# Patient Record
Sex: FEMALE | Race: WHITE | Employment: UNEMPLOYED | RURAL
[De-identification: names, ages, dates, MRNs, and addresses within clinical notes are randomized per-mention and may not be internally consistent; named-entity substitution may affect disease eponyms.]

---

## 2017-01-06 ENCOUNTER — OFFICE VISIT (OUTPATIENT)
Dept: FAMILY MEDICINE CLINIC | Age: 62
End: 2017-01-06

## 2017-01-06 VITALS
OXYGEN SATURATION: 97 % | SYSTOLIC BLOOD PRESSURE: 140 MMHG | DIASTOLIC BLOOD PRESSURE: 74 MMHG | BODY MASS INDEX: 29.87 KG/M2 | RESPIRATION RATE: 17 BRPM | WEIGHT: 174 LBS | TEMPERATURE: 98.7 F | HEART RATE: 82 BPM

## 2017-01-06 DIAGNOSIS — I10 HTN (HYPERTENSION), BENIGN: ICD-10-CM

## 2017-01-06 DIAGNOSIS — Z23 ENCOUNTER FOR IMMUNIZATION: ICD-10-CM

## 2017-01-06 DIAGNOSIS — K75.4 AUTOIMMUNE HEPATITIS (HCC): Primary | ICD-10-CM

## 2017-01-06 DIAGNOSIS — R74.8 ABNORMAL LIVER ENZYMES: ICD-10-CM

## 2017-01-06 RX ORDER — LISINOPRIL AND HYDROCHLOROTHIAZIDE 10; 12.5 MG/1; MG/1
1 TABLET ORAL DAILY
Qty: 90 TAB | Refills: 3 | Status: SHIPPED | OUTPATIENT
Start: 2017-01-06 | End: 2018-05-09 | Stop reason: SDUPTHER

## 2017-01-06 NOTE — MR AVS SNAPSHOT
Visit Information Date & Time Provider Department Dept. Phone Encounter #  
 1/6/2017  1:20 [place] [address] [place] [id number] Follow-up Instructions Return in about 6 months (around 7/6/2017). Follow-up and Disposition History Upcoming Health Maintenance Date Due Hepatitis C Screening 1955 DTaP/Tdap/Td series (1 - Tdap) 9/10/1976 PAP AKA CERVICAL CYTOLOGY 9/10/1976 BREAST CANCER SCRN MAMMOGRAM 9/10/2005 FOBT Q 1 YEAR AGE 50-75 9/10/2005 ZOSTER VACCINE AGE 60> 9/10/2015 INFLUENZA AGE 9 TO ADULT 8/1/2016 Allergies as of 1/6/2017  Review Complete On: 1/6/2017 By: Corinne Bugler, MD  
 No Known Allergies Current Immunizations  Reviewed on 1/6/2017 Name Date Influenza Vaccine 1/6/2017 Reviewed by Anastasiia Gardner LPN on 4/2/2208 at  8:75 PM  
You Were Diagnosed With   
  
 Codes Comments [facility] ([facility]    -  Primary ICD-10-CM: K75.4 ICD-9-CM: 571.42 Abnormal liver enzymes     ICD-10-CM: R74.8 ICD-9-CM: 790.5 HTN (hypertension), benign     ICD-10-CM: I10 
ICD-9-CM: 401.1 Encounter for immunization     ICD-10-CM: D35 ICD-9-CM: V03.89 Vitals BP Pulse Temp Resp Weight(growth percentile) SpO2  
 140/74 (BP 1 Location: Right arm, BP Patient Position: Sitting) 82 98.7 °F (37.1 °C) (Oral) 17 174 lb (78.9 kg) 97% BMI OB Status Smoking Status 29.87 kg/m2 Hysterectomy Never Smoker BMI and BSA Data Body Mass Index Body Surface Area  
 29.87 kg/m 2 1.89 m 2 Preferred Pharmacy Pharmacy Name Phone [address], [address] AT [facility] OF  [address] & [address] [phone] Your Updated Medication List  
  
   
This list is accurate as of: 1/6/17  1:57 PM.  Always use your most recent med list.  
  
  
  
  
 escitalopram oxalate 20 mg tablet Commonly known as:  Noble Lopez TAKE 1 TABLET BY MOUTH DAILY HYDROcodone-acetaminophen 7.5-325 mg per tablet Commonly known as:  Jonathan Bent Take 1 Tab by mouth every four (4) hours as needed for Pain.  
  
 ketorolac 10 mg tablet Commonly known as:  TORADOL Take 10 mg by mouth every six (6) hours as needed for Pain. lisinopril-hydroCHLOROthiazide 10-12.5 mg per tablet Commonly known as:  Linda Altamirano Take 1 Tab by mouth daily. predniSONE 20 mg tablet Commonly known as:  DELTASONE  
3 po qdx7 d, then 2 po qdx7 d then 1.5 po qdx14 d then 1 po qd for liver  
  
 promethazine 25 mg tablet Commonly known as:  PHENERGAN Take 1 Tab by mouth every six (6) hours as needed for Nausea. Indications: nausea STOOL SOFTENER 100 mg capsule Generic drug:  docusate sodium Take 100 mg by mouth two (2) times daily as needed for Constipation. traZODone 50 mg tablet Commonly known as:  Lemmie Seble Take 2 Tabs by mouth nightly. Prescriptions Sent to Pharmacy Refills  
 lisinopril-hydroCHLOROthiazide (PRINZIDE, ZESTORETIC) 10-12.5 mg per tablet 3 Sig: Take 1 Tab by mouth daily. Class: Normal  
 Pharmacy: [facility] [place] [address], [facility] [address] Ph #: [phone] Route: Oral  
  
We Performed the Following ACTIN (SMOOTH MUSCLE) ANTIBODY [id number] CPT(R)] HEPATIC FUNCTION PANEL [13716 CPT(R)] INFLUENZA VIRUS VACCINE, FLULAVAL VACC, 3 YRS & >, IM, MEDICARE ONLY [ [facility]] MITOCHONDRIAL M2 AB [id number] CPT(R)] PROTEIN ELECTROPHORESIS [06029 CPT(R)] Follow-up Instructions Return in about 6 months (around 7/6/2017). Patient Instructions If you have any questions regarding [employer], you may call [employer] support at [phone]. Introducing [facility] SERVICES!    
 Elvi Luna introduces [employer] patient portal. Now you can access parts of your medical record, email your doctor's office, and request medication refills online. 1. In your internet browser, go to [email or web address] 2. Click on the First Time User? Click Here link in the Sign In box. You will see the New Member Sign Up page. 3. Enter your CallAround Access Code exactly as it appears below. You will not need to use this code after youve completed the sign-up process. If you do not sign up before the expiration date, you must request a new code. · CallAround Access Code: [email or web address] Expires: 1/30/2017 11:44 AM 
 
4. Enter the last four digits of your Social Security Number (xxxx) and Date of Birth (mm/dd/yyyy) as indicated and click Submit. You will be taken to the next sign-up page. 5. Create a CallAround ID. This will be your CallAround login ID and cannot be changed, so think of one that is secure and easy to remember. 6. Create a CallAround password. You can change your password at any time. 7. Enter your Password Reset Question and Answer. This can be used at a later time if you forget your password. 8. Enter your e-mail address. You will receive e-mail notification when new information is available in [address] 9. Click Sign Up. You can now view and download portions of your medical record. 10. Click the Download Summary menu link to download a portable copy of your medical information. If you have questions, please visit the Frequently Asked Questions section of the CallAround website. Remember, CallAround is NOT to be used for urgent needs. For medical emergencies, dial 911. Now available from your iPhone and Android! Please provide this summary of care documentation to your next provider. Your primary care clinician is listed as Trey Henriquez. If you have any questions after today's visit, please call [phone].

## 2017-01-06 NOTE — PROGRESS NOTES
Summer Sargent is a 64 y.o. female presenting for/with: Ankle Injury (followup for Hepatits visit. ) and Knee Pain (having MRI 1-16-17 [facility])      HPI:  F/U autoimmune heptatitis  Went off of prednisone for 6 weeks post ORIF L ankle with Dr. Ayad Guy, never went back on, but sx have been in full remission since last visit. S/P US, labs, CT abd/pelvis, showing no GB problems, stable hepatic cysts, bilat renal calculi (oxalate per UA), neg 48h urine cx, mildly low K+, mod elev AST, ALT, and Alk Phos, mildly low PLT at 127, with normal bili, Troponin, lipase, INR, WBC, H/H, EKG, and normal viral hepatitis assays. iron level normal. CITLALY normal. Sed rate 3. Monospot neg. Treatment to date: prednisone suppression tx, nataly well, off since Nov 2017. No loose stool though or acholic stools reported. PMH, SH, Medications/Allergies: reviewed, on chart. ROS:  Constitutional: No fever, chills or weight loss  Respiratory: No cough, SOB   CV: No chest pain or Palpitations    Visit Vitals    /74 (BP 1 Location: Right arm, BP Patient Position: Sitting)    Pulse 82    Temp 98.7 °F (37.1 °C) (Oral)    Resp 17    Wt 174 lb (78.9 kg)    SpO2 97%    BMI 29.87 kg/m2     Wt Readings from Last 3 Encounters:   01/06/17 174 lb (78.9 kg)   11/15/16 175 lb (79.4 kg)   11/03/16 179 lb (81.2 kg)     Physical Examination: General appearance - alert, well appearing, and in no distress  Mental status - alert, oriented to person, place, and time  Eyes - pupils equal and reactive, extraocular eye movements intact  ENT - bilateral external ears and nose normal. Normal lips  Neck - supple, no significant adenopathy, no thyromegaly or mass  Lymphatics - no palpable lymphadenopathy, no hepatosplenomegaly  Chest - clear to auscultation, no wheezes, rales or rhonchi, symmetric air entry  Heart - normal rate, regular rhythm, normal S1, S2, no murmurs, rubs, clicks or gallops  Abd - NABS. Soft.  Mildly ttp to RUQ, elicits index discomfort, no G/R/R, no HSM/Mass. Extremities - peripheral pulses normal, no pedal edema, no clubbing or cyanosis     A/P:  Probable Autoimmune hepatitis. Recheck LFT's, check ASMA, AMA, SPEP. Ok to hold prednisone for now if LFT's are in normal range. Liver precautions. F/U 3mo or PRN labs. Ankle fx  Doing well. F/U with Ortho in Feb    HTN  well controlled. con't current tx. RF lisinopril.     Flu shot today  Gisele Vann MD

## 2017-01-06 NOTE — PATIENT INSTRUCTIONS
If you have any questions regarding [employer], you may call [employer] support at [phone].

## 2017-01-09 LAB — SPECIMEN STATUS REPORT, ROLRST: NORMAL

## 2017-01-11 LAB
ACTIN IGG SERPL-ACNC: 13 UNITS (ref 0–19)
ALBUMIN SERPL ELPH-MCNC: 3.5 G/DL (ref 2.9–4.4)
ALBUMIN SERPL-MCNC: 4 G/DL (ref 3.6–4.8)
ALBUMIN/GLOB SERPL: 1.3 {RATIO} (ref 0.7–1.7)
ALP SERPL-CCNC: 106 IU/L (ref 39–117)
ALPHA1 GLOB SERPL ELPH-MCNC: 0.2 G/DL (ref 0–0.4)
ALPHA2 GLOB SERPL ELPH-MCNC: 0.6 G/DL (ref 0.4–1)
ALT SERPL-CCNC: 19 IU/L (ref 0–32)
AST SERPL-CCNC: 20 IU/L (ref 0–40)
B-GLOBULIN SERPL ELPH-MCNC: 1 G/DL (ref 0.7–1.3)
BILIRUB DIRECT SERPL-MCNC: 0.11 MG/DL (ref 0–0.4)
BILIRUB SERPL-MCNC: 0.3 MG/DL (ref 0–1.2)
GAMMA GLOB SERPL ELPH-MCNC: 0.8 G/DL (ref 0.4–1.8)
GLOBULIN SER CALC-MCNC: 2.6 G/DL (ref 2.2–3.9)
M PROTEIN SERPL ELPH-MCNC: NORMAL G/DL
MITOCHONDRIA M2 IGG SER-ACNC: 6.6 UNITS (ref 0–20)
PLEASE NOTE, 011150: NORMAL
PROT SERPL-MCNC: 6.1 G/DL (ref 6–8.5)

## 2017-01-11 NOTE — PROGRESS NOTES
Liver is back to normal. Looks like a transient liver irritation. No sign current autoimmune activity.

## 2017-09-29 ENCOUNTER — OFFICE VISIT (OUTPATIENT)
Dept: FAMILY MEDICINE CLINIC | Age: 62
End: 2017-09-29

## 2017-09-29 VITALS
HEART RATE: 68 BPM | BODY MASS INDEX: 31.07 KG/M2 | HEIGHT: 64 IN | TEMPERATURE: 99 F | SYSTOLIC BLOOD PRESSURE: 110 MMHG | OXYGEN SATURATION: 97 % | WEIGHT: 182 LBS | DIASTOLIC BLOOD PRESSURE: 61 MMHG

## 2017-09-29 DIAGNOSIS — I10 HTN (HYPERTENSION), BENIGN: ICD-10-CM

## 2017-09-29 DIAGNOSIS — Z12.39 SCREENING FOR MALIGNANT NEOPLASM OF BREAST: ICD-10-CM

## 2017-09-29 DIAGNOSIS — N95.1 MENOPAUSAL FLUSHING: ICD-10-CM

## 2017-09-29 DIAGNOSIS — F32.5 MAJOR DEPRESSIVE DISORDER WITH SINGLE EPISODE, IN FULL REMISSION (HCC): ICD-10-CM

## 2017-09-29 DIAGNOSIS — K75.4 AUTOIMMUNE HEPATITIS (HCC): ICD-10-CM

## 2017-09-29 DIAGNOSIS — Z23 ENCOUNTER FOR IMMUNIZATION: Primary | ICD-10-CM

## 2017-09-29 RX ORDER — VENLAFAXINE HYDROCHLORIDE 37.5 MG/1
CAPSULE, EXTENDED RELEASE ORAL
Qty: 30 CAP | Refills: 11 | Status: SHIPPED | OUTPATIENT
Start: 2017-09-29 | End: 2018-02-16 | Stop reason: ALTCHOICE

## 2017-09-29 RX ORDER — VENLAFAXINE HYDROCHLORIDE 75 MG/1
75 CAPSULE, EXTENDED RELEASE ORAL DAILY
Qty: 30 CAP | Refills: 11 | Status: SHIPPED | OUTPATIENT
Start: 2017-09-29 | End: 2018-02-16 | Stop reason: ALTCHOICE

## 2017-09-29 NOTE — PROGRESS NOTES
Guillermina Woo is a 58 y.o. female presenting for/with:    Abnormal liver tests (bw); Numbness (right hand); and Excessive Sweating (hot flash)    HPI:  Menopausal syndrome  Pt notes hot flushing is getting gradually worse over past year or so. Interested in tx     Anxiety and depression  Have been doing well on lexapro, nataly well. PHQ over the last two weeks 9/29/2017   Little interest or pleasure in doing things Not at all   Feeling down, depressed or hopeless Not at all   Total Score PHQ 2 0   Thoughts of being better off dead, or hurting yourself in some way Not at all     DJD  Using aleve and motrin PRN for joints. Works pretty well, minimal heartburn sx, rare. Autoimmune hepatitis  Sx resolved after last year. Last LFT's normalized by last Jan 2017. Due for recheck  Lab Results   Component Value Date/Time    ALT (SGPT) 19 01/06/2017 01:50 PM    AST (SGOT) 20 01/06/2017 01:50 PM    Alk. phosphatase 106 01/06/2017 01:50 PM    Bilirubin, direct 0.11 01/06/2017 01:50 PM    Bilirubin, total 0.3 01/06/2017 01:50 PM     Hypertension. Blood pressures have been well controlled. Management at last visit included continuing current regimen . Current regimen: ACE inhibitor and thiazide diuretic. Symptoms include no symptoms. Patient denies chest pain, palpitations, peripheral edema. Lab review: No results found for: NA, K, CL, CO2, AGAP, GLU, BUN, CREA, BUCR, GFRAA, GFRNA, CA, GFRAA  No results found for: CHOL, CHOLPOCT, CHOLX, CHLST, CHOLV, HDL, HDLPOC, LDL, LDLCPOC, LDLC, DLDLP, VLDLC, VLDL, TGLX, TRIGL, TRIGP, TGLPOCT, CHHD, CHHDX    PMH, SH, Medications/Allergies: reviewed, on chart.     ROS:  Constitutional: No fever, chills or weight loss  Respiratory: No cough, SOB   CV: No chest pain or Palpitations    Visit Vitals    /61    Pulse 68    Temp 99 °F (37.2 °C) (Temporal)    Ht 5' 4\" (1.626 m)    Wt 182 lb (82.6 kg)    SpO2 97%    BMI 31.24 kg/m2     Wt Readings from Last 3 Encounters:   09/29/17 182 lb (82.6 kg)   01/06/17 174 lb (78.9 kg)   11/15/16 175 lb (79.4 kg)   +8#    Physical Examination: General appearance - alert, well appearing, and in no distress  Mental status - alert, oriented to person, place, and time  Eyes - pupils equal and reactive, extraocular eye movements intact  ENT - bilateral external ears and nose normal. Normal lips  Neck - supple, no significant adenopathy, no thyromegaly or mass  Lymphatics - no palpable lymphadenopathy, no hepatosplenomegaly  Chest - clear to auscultation, no wheezes, rales or rhonchi, symmetric air entry  Heart - normal rate, regular rhythm, normal S1, S2, no murmurs, rubs, clicks or gallops  Extremities - peripheral pulses normal, no pedal edema, no clubbing or cyanosis    A/P:  Hx autoimmune hepatitis  Doing well. Recheck labs    Hot flushing/menopause and depression  Try change lexapro to effexor. HTN  well controlled. con't current tx. Check routine labs. F/U 6mo/PRN.

## 2017-09-29 NOTE — MR AVS SNAPSHOT
Visit Information Date & Time Provider Department Dept. Phone Encounter #  
 9/29/2017  9:30 AM Wilbur Herrera MD Barbara Almazan [id number] Follow-up Instructions Return in about 6 months (around 3/29/2018), or if symptoms worsen or fail to improve. Follow-up and Disposition History Upcoming Health Maintenance Date Due Hepatitis C Screening 1955 BREAST CANCER SCRN MAMMOGRAM 9/10/2005 FOBT Q 1 YEAR AGE 50-75 9/10/2005 ZOSTER VACCINE AGE 60> 7/10/2015 INFLUENZA AGE 9 TO ADULT 8/1/2017 PAP AKA CERVICAL CYTOLOGY 9/30/2027* DTaP/Tdap/Td series (2 - Td) 9/29/2027 *Topic was postponed. The date shown is not the original due date. Allergies as of 9/29/2017  Review Complete On: 9/29/2017 By: Wilbur Herrera MD  
 No Known Allergies Current Immunizations  Reviewed on 9/29/2017 Name Date Influenza Vaccine 1/6/2017 Influenza Vaccine (Quad) PF 9/29/2017 Reviewed by Oscar Gomez LPN on 7/53/7098 at  9:55 AM  
 Reviewed by Oscar Gomez LPN on 0/73/3261 at  9:55 AM  
 Reviewed by Oscar Gomez LPN on 6/02/9962 at  9:55 AM  
You Were Diagnosed With   
  
 Codes Comments Encounter for immunization    -  Primary ICD-10-CM: O73 ICD-9-CM: V03.89 HTN (hypertension), benign     ICD-10-CM: I10 
ICD-9-CM: 401.1 Autoimmune hepatitis ([facility]     ICD-10-CM: K75.4 ICD-9-CM: 571.42 Major depressive disorder with single episode, in full remission ([facility]     ICD-10-CM: F32.5 ICD-9-CM: 296.26 Menopausal flushing     ICD-10-CM: N95.1 ICD-9-CM: 627.2 Screening for malignant neoplasm of breast     ICD-10-CM: Z12.39 
ICD-9-CM: V76.10 Vitals BP Pulse Temp Height(growth percentile) Weight(growth percentile) SpO2  
 110/61 68 99 °F (37.2 °C) (Temporal) 5' 4\" (1.626 m) 182 lb (82.6 kg) 97% BMI OB Status Smoking Status 31.24 kg/m2 Hysterectomy Never Smoker BMI and BSA Data Body Mass Index Body Surface Area  
 31.24 kg/m 2 1.93 m 2 Preferred Pharmacy Pharmacy Name Phone [address], [address] AT [facility] OF [address] [place] [place] [phone] Your Updated Medication List  
  
   
This list is accurate as of: 17 11:47 AM.  Always use your most recent med list.  
  
  
  
  
 escitalopram oxalate 20 mg tablet Commonly known as:  Chandler Rye Take 1 Tab by mouth daily. Indications: DUE FOR VISIT  
  
 lisinopril-hydroCHLOROthiazide 10-12.5 mg per tablet Commonly known as:  Carmelo Sinning Take 1 Tab by mouth daily. STOOL SOFTENER 100 mg capsule Generic drug:  docusate sodium Take 100 mg by mouth two (2) times daily as needed for Constipation. traZODone 50 mg tablet Commonly known as:  Donneta [place] Take 2 Tabs by mouth nightly. varicella zoster vacine live 19,400 unit/0.65 mL Susr injection Commonly known as:  ZOSTAVAX  
1 Vial by SubCUTAneous route once for 1 dose. * venlafaxine-SR 37.5 mg capsule Commonly known as:  EFFEXOR-XR  
1 daily for 6 days, then 2 daily until out for nerves and flushing * venlafaxine-SR 75 mg capsule Commonly known as:  EFFEXOR-XR Take 1 Cap by mouth daily. Indications: flushing and nerves * Notice: This list has 2 medication(s) that are the same as other medications prescribed for you. Read the directions carefully, and ask your doctor or other care provider to review them with you. Prescriptions Printed Refills  
 varicella zoster vacine live (ZOSTAVAX) 19,400 unit/0.65 mL susr injection 0 Si Vial by SubCUTAneous route once for 1 dose. Class: Print Route: SubCUTAneous  
 venlafaxine-SR (EFFEXOR-XR) 75 mg capsule 11 Sig: Take 1 Cap by mouth daily. Indications: flushing and nerves Class: Print Route: Oral  
  
Prescriptions Sent to Pharmacy Refills venlafaxine-SR (EFFEXOR-XR) 37.5 mg capsule 11 Si daily for 6 days, then 2 daily until out for nerves and flushing Class: Normal  
 Pharmacy: [facility] [place] [address], [address] [place] [address] [address] H[address] Ph #: [phone] We Performed the Following ADMIN INFLUENZA VIRUS VAC [ [facility]S] INFLUENZA VIRUS VAC QUAD,SPLIT,PRESV FREE SYRINGE IM [id number] CPT(R)] LIPID PANEL [25999 CPT(R)] METABOLIC PANEL, COMPREHENSIVE [67148 CPT(R)] Follow-up Instructions Return in about 6 months (around 3/29/2018), or if symptoms worsen or fail to improve. To-Do List   
 2017 Imaging:  [facility] MAMMO BI SCREENING INCL CAD Patient Instructions Cut the lexapro to half pill for 6 days, then half every other day for 6 days, then stop. Start the venlafaxine 37.5mg daily for 6 days, then 2 daily. If you have any questions regarding [employer], you may call [employer] support at [phone]. Patient Instructions History Introducing [facility] & HEALTH SERVICES! [employer] [place] introduces [employer] patient portal. Now you can access parts of your medical record, email your doctor's office, and request medication refills online. 1. In your internet browser, go to [email or web address] 2. Click on the First Time User? Click Here link in the Sign In box. You will see the New Member Sign Up page. 3. Enter your [employer] Access Code exactly as it appears below. You will not need to use this code after youve completed the sign-up process. If you do not sign up before the expiration date, you must request a new code. · [employer] Access Code: [email or web address] Expires: 2017  9:26 AM 
 
4. Enter the last four digits of your Social Security Number (xxxx) and Date of Birth (mm/dd/yyyy) as indicated and click Submit. You will be taken to the next sign-up page. 5. Create a MyChart ID.  This will be your MyChart login ID and cannot be changed, so think of one that is secure and easy to remember. 6. Create a [employer] password. You can change your password at any time. 7. Enter your Password Reset Question and Answer. This can be used at a later time if you forget your password. 8. Enter your e-mail address. You will receive e-mail notification when new information is available in [address] 9. Click Sign Up. You can now view and download portions of your medical record. 10. Click the Download Summary menu link to download a portable copy of your medical information. If you have questions, please visit the Frequently Asked Questions section of the [employer] website. Remember, [employer] is NOT to be used for urgent needs. For medical emergencies, dial 911. Now available from your iPhone and Android! Please provide this summary of care documentation to your next provider. Your primary care clinician is listed as Merari Ivy. If you have any questions after today's visit, please call [phone].

## 2017-09-29 NOTE — PATIENT INSTRUCTIONS
Cut the lexapro to half pill for 6 days, then half every other day for 6 days, then stop. Start the venlafaxine 37.5mg daily for 6 days, then 2 daily. If you have any questions regarding mychart, you may call [employer] support at [phone].

## 2017-09-30 LAB
ALBUMIN SERPL-MCNC: 4.5 G/DL (ref 3.6–4.8)
ALBUMIN/GLOB SERPL: 2.3 {RATIO} (ref 1.2–2.2)
ALP SERPL-CCNC: 99 IU/L (ref 39–117)
ALT SERPL-CCNC: 14 IU/L (ref 0–32)
AST SERPL-CCNC: 15 IU/L (ref 0–40)
BILIRUB SERPL-MCNC: 0.2 MG/DL (ref 0–1.2)
BUN SERPL-MCNC: 17 MG/DL (ref 8–27)
BUN/CREAT SERPL: 32 (ref 12–28)
CALCIUM SERPL-MCNC: 9.3 MG/DL (ref 8.7–10.3)
CHLORIDE SERPL-SCNC: 102 MMOL/L (ref 96–106)
CHOLEST SERPL-MCNC: 225 MG/DL (ref 100–199)
CO2 SERPL-SCNC: 27 MMOL/L (ref 18–29)
CREAT SERPL-MCNC: 0.53 MG/DL (ref 0.57–1)
GLOBULIN SER CALC-MCNC: 2 G/DL (ref 1.5–4.5)
GLUCOSE SERPL-MCNC: 112 MG/DL (ref 65–99)
HDLC SERPL-MCNC: 65 MG/DL
LDLC SERPL CALC-MCNC: 137 MG/DL (ref 0–99)
POTASSIUM SERPL-SCNC: 4.4 MMOL/L (ref 3.5–5.2)
PROT SERPL-MCNC: 6.5 G/DL (ref 6–8.5)
SODIUM SERPL-SCNC: 142 MMOL/L (ref 134–144)
TRIGL SERPL-MCNC: 113 MG/DL (ref 0–149)
VLDLC SERPL CALC-MCNC: 23 MG/DL (ref 5–40)

## 2017-12-27 DIAGNOSIS — K75.9 HEPATITIS: ICD-10-CM

## 2017-12-27 DIAGNOSIS — R74.8 ABNORMAL LIVER ENZYMES: ICD-10-CM

## 2017-12-29 RX ORDER — PREDNISONE 20 MG/1
TABLET ORAL
Qty: 60 TAB | Refills: 0 | Status: SHIPPED | OUTPATIENT
Start: 2017-12-29 | End: 2018-02-16

## 2018-02-16 ENCOUNTER — OFFICE VISIT (OUTPATIENT)
Dept: FAMILY MEDICINE CLINIC | Age: 63
End: 2018-02-16

## 2018-02-16 VITALS
OXYGEN SATURATION: 96 % | HEIGHT: 64 IN | DIASTOLIC BLOOD PRESSURE: 62 MMHG | RESPIRATION RATE: 16 BRPM | WEIGHT: 183.4 LBS | HEART RATE: 87 BPM | SYSTOLIC BLOOD PRESSURE: 100 MMHG | BODY MASS INDEX: 31.31 KG/M2

## 2018-02-16 DIAGNOSIS — F32.A ANXIETY AND DEPRESSION: Primary | ICD-10-CM

## 2018-02-16 DIAGNOSIS — K75.4 AUTOIMMUNE HEPATITIS (HCC): ICD-10-CM

## 2018-02-16 DIAGNOSIS — F41.9 ANXIETY AND DEPRESSION: Primary | ICD-10-CM

## 2018-02-16 PROBLEM — F32.1 MODERATE MAJOR DEPRESSION (HCC): Status: ACTIVE | Noted: 2018-02-16

## 2018-02-16 RX ORDER — ESCITALOPRAM OXALATE 20 MG/1
20 TABLET ORAL DAILY
Qty: 30 TAB | Refills: 5 | Status: SHIPPED | OUTPATIENT
Start: 2018-02-16 | End: 2018-09-26 | Stop reason: SDUPTHER

## 2018-02-16 RX ORDER — TRAZODONE HYDROCHLORIDE 50 MG/1
100 TABLET ORAL
Qty: 60 TAB | Refills: 5 | Status: SHIPPED | OUTPATIENT
Start: 2018-02-16 | End: 2019-04-24 | Stop reason: SDUPTHER

## 2018-02-16 NOTE — PATIENT INSTRUCTIONS
Anxiety Disorder: Care Instructions  Your Care Instructions    Anxiety is a normal reaction to stress. Difficult situations can cause you to have symptoms such as sweaty palms and a nervous feeling. In an anxiety disorder, the symptoms are far more severe. Constant worry, muscle tension, trouble sleeping, nausea and diarrhea, and other symptoms can make normal daily activities difficult or impossible. These symptoms may occur for no reason, and they can affect your work, school, or social life. Medicines, counseling, and self-care can all help. Follow-up care is a key part of your treatment and safety. Be sure to make and go to all appointments, and call your doctor if you are having problems. It's also a good idea to know your test results and keep a list of the medicines you take. How can you care for yourself at home? · Take medicines exactly as directed. Call your doctor if you think you are having a problem with your medicine. · Go to your counseling sessions and follow-up appointments. · Recognize and accept your anxiety. Then, when you are in a situation that makes you anxious, say to yourself, \"This is not an emergency. I feel uncomfortable, but I am not in danger. I can keep going even if I feel anxious. \"  · Be kind to your body:  ¨ Relieve tension with exercise or a massage. ¨ Get enough rest.  ¨ Avoid alcohol, caffeine, nicotine, and illegal drugs. They can increase your anxiety level and cause sleep problems. ¨ Learn and do relaxation techniques. See below for more about these techniques. · Engage your mind. Get out and do something you enjoy. Go to a funny movie, or take a walk or hike. Plan your day. Having too much or too little to do can make you anxious. · Keep a record of your symptoms. Discuss your fears with a good friend or family member, or join a support group for people with similar problems. Talking to others sometimes relieves stress.   · Get involved in social groups, or volunteer to help others. Being alone sometimes makes things seem worse than they are. · Get at least 30 minutes of exercise on most days of the week to relieve stress. Walking is a good choice. You also may want to do other activities, such as running, swimming, cycling, or playing tennis or team sports. Relaxation techniques  Do relaxation exercises 10 to 20 minutes a day. You can play soothing, relaxing music while you do them, if you wish. · Tell others in your house that you are going to do your relaxation exercises. Ask them not to disturb you. · Find a comfortable place, away from all distractions and noise. · Lie down on your back, or sit with your back straight. · Focus on your breathing. Make it slow and steady. · Breathe in through your nose. Breathe out through either your nose or mouth. · Breathe deeply, filling up the area between your navel and your rib cage. Breathe so that your belly goes up and down. · Do not hold your breath. · Breathe like this for 5 to 10 minutes. Notice the feeling of calmness throughout your whole body. As you continue to breathe slowly and deeply, relax by doing the following for another 5 to 10 minutes:  · Tighten and relax each muscle group in your body. You can begin at your toes and work your way up to your head. · Imagine your muscle groups relaxing and becoming heavy. · Empty your mind of all thoughts. · Let yourself relax more and more deeply. · Become aware of the state of calmness that surrounds you. · When your relaxation time is over, you can bring yourself back to alertness by moving your fingers and toes and then your hands and feet and then stretching and moving your entire body. Sometimes people fall asleep during relaxation, but they usually wake up shortly afterward. · Always give yourself time to return to full alertness before you drive a car or do anything that might cause an accident if you are not fully alert.  Never play a relaxation tape while you drive a car. When should you call for help? Call 911 anytime you think you may need emergency care. For example, call if:  ? · You feel you cannot stop from hurting yourself or someone else. ? Keep the numbers for these national suicide hotlines: 5-191-436-TALK (6-600.255.7599) and 6-573-IQCLQAR (2-908.485.9915). If you or someone you know talks about suicide or feeling hopeless, get help right away. ? Watch closely for changes in your health, and be sure to contact your doctor if:  ? · You have anxiety or fear that affects your life. ? · You have symptoms of anxiety that are new or different from those you had before. Where can you learn more? Go to http://anjum-alfredo.info/. Enter P754 in the search box to learn more about \"Anxiety Disorder: Care Instructions. \"  Current as of: May 12, 2017  Content Version: 11.4  © 2085-9266 [employer]. Care instructions adapted under license by [employer] (which disclaims liability or warranty for this information). If you have questions about a medical condition or this instruction, always ask your healthcare professional. [address] any warranty or liability for your use of this information.

## 2018-02-16 NOTE — MR AVS SNAPSHOT
Nathalia Merrill 
 
 
 [address] [facility] [phone] Patient: Madison Vincent MRN: [id number] SLO:[birth year] Visit Information Date & Time Provider Department Dept. Phone Encounter #  
 2/16/2018 11:10 AM Ralph Ventura NP [address] [id number] Follow-up Instructions Return in about 2 weeks (around 3/2/2018). Follow-up and Disposition History Upcoming Health Maintenance Date Due Hepatitis C Screening 1955 BREAST CANCER SCRN MAMMOGRAM 9/10/2005 FOBT Q 1 YEAR AGE 50-75 9/10/2005 ZOSTER VACCINE AGE 60> 7/10/2015 PAP AKA CERVICAL CYTOLOGY 9/30/2027* DTaP/Tdap/Td series (2 - Td) 9/29/2027 *Topic was postponed. The date shown is not the original due date. Allergies as of 2/16/2018  Review Complete On: 2/16/2018 By: Ralph Ventura NP No Known Allergies Current Immunizations  Reviewed on 9/29/2017 Name Date Influenza Vaccine 1/6/2017 Influenza Vaccine (Quad) PF 9/29/2017 Not reviewed this visit You Were Diagnosed With   
  
 Codes Comments Anxiety and depression    -  Primary ICD-10-CM: F41.8 ICD-9-CM: 300.00, 311 Autoimmune hepatitis ([facility] [address]     ICD-10-CM: K75.4 ICD-9-CM: 571.42 Vitals BP Pulse Resp Height(growth percentile) Weight(growth percentile) SpO2  
 100/62 (BP 1 Location: Left arm, BP Patient Position: Sitting) 87 16 5' 4\" (1.626 m) 183 lb 6.4 oz (83.2 kg) 96% BMI OB Status Smoking Status 31.48 kg/m2 Hysterectomy Never Smoker Vitals History BMI and BSA Data Body Mass Index Body Surface Area  
 31.48 kg/m 2 1.94 m 2 Preferred Pharmacy Pharmacy Name Phone [address], [address] [facility]  [address] [facility] Nils Saldivarquentin [phone] Your Updated Medication List  
  
   
This list is accurate as of: 2/16/18  1:21 PM.  Always use your most recent med list.  
  
  
  
  
 escitalopram oxalate 20 mg tablet Commonly known as:  Nancy Adhikari Take 1 Tab by mouth daily. lisinopril-hydroCHLOROthiazide 10-12.5 mg per tablet Commonly known as:  Darlene Cha Take 1 Tab by mouth daily. STOOL SOFTENER 100 mg capsule Generic drug:  docusate sodium Take 100 mg by mouth two (2) times daily as needed for Constipation. traZODone 50 mg tablet Commonly known as:  Aiden Wilfrido Take 2 Tabs by mouth nightly. Prescriptions Sent to Pharmacy Refills  
 traZODone (DESYREL) 50 mg tablet 5 Sig: Take 2 Tabs by mouth nightly. Class: Normal  
 Pharmacy: [facility] [address]  Ph #: [phone] Route: Oral  
 escitalopram oxalate (LEXAPRO) 20 mg tablet 5 Sig: Take 1 Tab by mouth daily. Class: Normal  
 Pharmacy: [facility] [address]  Ph #: [phone] Route: Oral  
  
We Performed the Following METABOLIC PANEL, COMPREHENSIVE [00335 CPT(R)] Follow-up Instructions Return in about 2 weeks (around 3/2/2018). Patient Instructions Anxiety Disorder: Care Instructions Your Care Instructions Anxiety is a normal reaction to stress. Difficult situations can cause you to have symptoms such as sweaty palms and a nervous feeling. In an anxiety disorder, the symptoms are far more severe. Constant worry, muscle tension, trouble sleeping, nausea and diarrhea, and other symptoms can make normal daily activities difficult or impossible. These symptoms may occur for no reason, and they can affect your work, school, or social life. Medicines, counseling, and self-care can all help. Follow-up care is a key part of your treatment and safety.  Be sure to make and go to all appointments, and call your doctor if you are having problems. It's also a good idea to know your test results and keep a list of the medicines you take. How can you care for yourself at home? · Take medicines exactly as directed. Call your doctor if you think you are having a problem with your medicine. · Go to your counseling sessions and follow-up appointments. · Recognize and accept your anxiety. Then, when you are in a situation that makes you anxious, say to yourself, \"This is not an emergency. I feel uncomfortable, but I am not in danger. I can keep going even if I feel anxious. \" · Be kind to your body: ¨ Relieve tension with exercise or a massage. ¨ Get enough rest. 
¨ Avoid alcohol, caffeine, nicotine, and illegal drugs. They can increase your anxiety level and cause sleep problems. ¨ Learn and do relaxation techniques. See below for more about these techniques. · Engage your mind. Get out and do something you enjoy. Go to a funny movie, or take a walk or hike. Plan your day. Having too much or too little to do can make you anxious. · Keep a record of your symptoms. Discuss your fears with a good friend or family member, or join a support group for people with similar problems. Talking to others sometimes relieves stress. · Get involved in social groups, or volunteer to help others. Being alone sometimes makes things seem worse than they are. · Get at least 30 minutes of exercise on most days of the week to relieve stress. Walking is a good choice. You also may want to do other activities, such as running, swimming, cycling, or playing tennis or team sports. Relaxation techniques Do relaxation exercises 10 to 20 minutes a day. You can play soothing, relaxing music while you do them, if you wish. · Tell others in your house that you are going to do your relaxation exercises. Ask them not to disturb you. · Find a comfortable place, away from all distractions and noise. · Lie down on your back, or sit with your back straight. · Focus on your breathing. Make it slow and steady. · Breathe in through your nose. Breathe out through either your nose or mouth. · Breathe deeply, filling up the area between your navel and your rib cage. Breathe so that your belly goes up and down. · Do not hold your breath. · Breathe like this for 5 to 10 minutes. Notice the feeling of calmness throughout your whole body. As you continue to breathe slowly and deeply, relax by doing the following for another 5 to 10 minutes: · Tighten and relax each muscle group in your body. You can begin at your toes and work your way up to your head. · Imagine your muscle groups relaxing and becoming heavy. · Empty your mind of all thoughts. · Let yourself relax more and more deeply. · Become aware of the state of calmness that surrounds you. · When your relaxation time is over, you can bring yourself back to alertness by moving your fingers and toes and then your hands and feet and then stretching and moving your entire body. Sometimes people fall asleep during relaxation, but they usually wake up shortly afterward. · Always give yourself time to return to full alertness before you drive a car or do anything that might cause an accident if you are not fully alert. Never play a relaxation tape while you drive a car. When should you call for help? Call 911 anytime you think you may need emergency care. For example, call if: 
? · You feel you cannot stop from hurting yourself or someone else. ? Keep the numbers for these national suicide hotlines: 1-742-967-TALK (9-456.730.3838) and 5-616-QJYPEMO (3-946.601.6790). If you or someone you know talks about suicide or feeling hopeless, get help right away. ? Watch closely for changes in your health, and be sure to contact your doctor if: 
? · You have anxiety or fear that affects your life. ? · You have symptoms of anxiety that are new or different from those you had before. Where can you learn more? Go to http://anjum-alfredo.info/. Enter P754 in the search box to learn more about \"Anxiety Disorder: Care Instructions. \" Current as of: May 12, 2017 Content Version: 11.4 © 0247-2485 Healthwise, [employer]. Care instructions adapted under license by [employer] (which disclaims liability or warranty for this information). If you have questions about a medical condition or this instruction, always ask your healthcare professional. [facility] any warranty or liability for your use of this information. Introducing [facility]! Patricia Hill introduces Collect.it patient portal. Now you can access parts of your medical record, email your doctor's office, and request medication refills online. 1. In your internet browser, go to [email or web address] 2. Click on the First Time User? Click Here link in the Sign In box. You will see the New Member Sign Up page. 3. Enter your Collect.it Access Code exactly as it appears below. You will not need to use this code after youve completed the sign-up process. If you do not sign up before the expiration date, you must request a new code. · Collect.it Access Code: [email or web address] Expires: 5/17/2018 11:34 AM 
 
4. Enter the last four digits of your Social Security Number (xxxx) and Date of Birth (mm/dd/yyyy) as indicated and click Submit. You will be taken to the next sign-up page. 5. Create a Collect.it ID. This will be your Collect.it login ID and cannot be changed, so think of one that is secure and easy to remember. 6. Create a Collect.it password. You can change your password at any time. 7. Enter your Password Reset Question and Answer. This can be used at a later time if you forget your password. 8. Enter your e-mail address. You will receive e-mail notification when new information is available in [address] 9. Click Sign Up. You can now view and download portions of your medical record. 10. Click the Download Summary menu link to download a portable copy of your medical information. If you have questions, please visit the Frequently Asked Questions section of the [facility] website. Remember, [facility] is NOT to be used for urgent needs. For medical emergencies, dial 911. Now available from your iPhone and Android! Please provide this summary of care documentation to your next provider. Your primary care clinician is listed as Morenita Ivy. If you have any questions after today's visit, please call [phone].

## 2018-02-16 NOTE — PROGRESS NOTES
Chief Complaint   Patient presents with    Hepatitis     autoimmune hepatits follow up appointment for bloodwork.  Depression     exttremely stressed, lost her job and feels all alone. Has gotten worse since last thursday when she told someone else was hired for the job. Has been taking lexapro since monday. HPI:      Mateo Wu is a 58 y.o. female. She has been a CNA. Menopausal syndrome  Pt notes hot flushing is getting gradually worse over past year or so. Switched from Lexapro to Effexor last visit. DJD  Using aleve and motrin PRN for joints. Works pretty well, minimal heartburn sx, rare. Autoimmune hepatitis  Sx resolved after last year. Last LFT's normalized by last Jan 2017. Due for recheck    New Issues:  She is extremely stressed. Lost her job last month and feels alone. She is single, but has 3 children in the area. Has gotten worse since last Tuesday when she was told that someone else was hired for the job she recently interviewed for. Has been taking Lexapro since Monday.  She has been seeing Loly Salinas, therapist, in the past.      PHQ over the last two weeks 2/16/2018   Little interest or pleasure in doing things Nearly every day   Feeling down, depressed or hopeless Nearly every day   Total Score PHQ 2 6   Trouble falling or staying asleep, or sleeping too much Nearly every day   Feeling tired or having little energy Nearly every day   Poor appetite or overeating More than half the days   Feeling bad about yourself - or that you are a failure or have let yourself or your family down Nearly every day   Trouble concentrating on things such as school, work, reading or watching TV Not at all   Moving or speaking so slowly that other people could have noticed; or the opposite being so fidgety that others notice Not at all   Thoughts of being better off dead, or hurting yourself in some way Not at all   PHQ 9 Score 17      No Known Allergies    Current Outpatient Prescriptions   Medication Sig    escitalopram oxalate (LEXAPRO) 20 mg tablet Take 1 Tab by mouth daily. Indications: DUE FOR VISIT    lisinopril-hydroCHLOROthiazide (PRINZIDE, ZESTORETIC) 10-12.5 mg per tablet Take 1 Tab by mouth daily.  docusate sodium (STOOL SOFTENER) 100 mg capsule Take 100 mg by mouth two (2) times daily as needed for Constipation.  traZODone (DESYREL) 50 mg tablet Take 2 Tabs by mouth nightly.  venlafaxine-SR (EFFEXOR-XR) 37.5 mg capsule 1 daily for 6 days, then 2 daily until out for nerves and flushing    venlafaxine-SR (EFFEXOR-XR) 75 mg capsule Take 1 Cap by mouth daily. Indications: flushing and nerves     No current facility-administered medications for this visit. Past Medical History:   Diagnosis Date    Calculus of kidney        Past Surgical History:   Procedure Laterality Date    HX HYSTERECTOMY      one ovary left       Social History     Social History    Marital status: SINGLE     Spouse name: N/A    Number of children: N/A    Years of education: N/A     Social History Main Topics    Smoking status: Never Smoker    Smokeless tobacco: Never Used    Alcohol use No    Drug use: No    Sexual activity: No     Other Topics Concern    None     Social History Narrative       No family history on file. Above history reviewed. ROS:  Denies fever, chills, cough, chest pain, SOB,  nausea, vomiting, or diarrhea. Denies wt loss, wt gain, hemoptysis, hematochezia or melena.     Physical Examination:    /62 (BP 1 Location: Left arm, BP Patient Position: Sitting)  Pulse 87  Resp 16  Ht 5' 4\" (1.626 m)  Wt 183 lb 6.4 oz (83.2 kg)  SpO2 96%  BMI 31.48 kg/m2    General: Alert and Ox3, Fluent speech, appears depressed, crying  Neck:  Supple, no adenopathy, JVD, mass or bruit  Chest:  Clear to Ausculation, without wheezes, rales, rubs or ronchi  Cardiac: RRR  Extremities:  No cyanosis, clubbing or edema  Neurologic:  Ambulatory without assist, CN 2-12 grossly intact. Moves all extremities. Skin: no rash  Lymphadenopathy: no cervical or supraclavicular nodes    ASSESSMENT AND PLAN:     1. Anxiety and depression  Continue Lexapro  May increase Trazodone to 2 tabs at night as needed  Denies SI  Encouraged patient to continue counseling  - traZODone (DESYREL) 50 mg tablet; Take 2 Tabs by mouth nightly. Dispense: 60 Tab; Refill: 5  - escitalopram oxalate (LEXAPRO) 20 mg tablet; Take 1 Tab by mouth daily. Dispense: 30 Tab; Refill: 5    2.  Autoimmune hepatitis ([facility] [address]  Checking labs  - METABOLIC PANEL, COMPREHENSIVE     RTC in 1-2 weeks    Tiffanie Welsh NP

## 2018-02-17 LAB
ALBUMIN SERPL-MCNC: 4.8 G/DL (ref 3.6–4.8)
ALBUMIN/GLOB SERPL: 2.1 {RATIO} (ref 1.2–2.2)
ALP SERPL-CCNC: 108 IU/L (ref 39–117)
ALT SERPL-CCNC: 14 IU/L (ref 0–32)
AST SERPL-CCNC: 15 IU/L (ref 0–40)
BILIRUB SERPL-MCNC: 0.5 MG/DL (ref 0–1.2)
BUN SERPL-MCNC: 20 MG/DL (ref 8–27)
BUN/CREAT SERPL: 29 (ref 12–28)
CALCIUM SERPL-MCNC: 9.5 MG/DL (ref 8.7–10.3)
CHLORIDE SERPL-SCNC: 97 MMOL/L (ref 96–106)
CO2 SERPL-SCNC: 26 MMOL/L (ref 18–29)
CREAT SERPL-MCNC: 0.68 MG/DL (ref 0.57–1)
GFR SERPLBLD CREATININE-BSD FMLA CKD-EPI: 108 ML/MIN/1.73
GFR SERPLBLD CREATININE-BSD FMLA CKD-EPI: 94 ML/MIN/1.73
GLOBULIN SER CALC-MCNC: 2.3 G/DL (ref 1.5–4.5)
GLUCOSE SERPL-MCNC: 76 MG/DL (ref 65–99)
POTASSIUM SERPL-SCNC: 4 MMOL/L (ref 3.5–5.2)
PROT SERPL-MCNC: 7.1 G/DL (ref 6–8.5)
SODIUM SERPL-SCNC: 142 MMOL/L (ref 134–144)

## 2018-02-19 ENCOUNTER — TELEPHONE (OUTPATIENT)
Dept: FAMILY MEDICINE CLINIC | Age: 63
End: 2018-02-19

## 2018-05-09 DIAGNOSIS — I10 HTN (HYPERTENSION), BENIGN: ICD-10-CM

## 2018-05-09 RX ORDER — LISINOPRIL AND HYDROCHLOROTHIAZIDE 10; 12.5 MG/1; MG/1
1 TABLET ORAL DAILY
Qty: 90 TAB | Refills: 3 | Status: SHIPPED | OUTPATIENT
Start: 2018-05-09 | End: 2018-05-23 | Stop reason: SDUPTHER

## 2019-10-30 PROBLEM — F32.1 MODERATE MAJOR DEPRESSION (HCC): Status: RESOLVED | Noted: 2018-02-16 | Resolved: 2019-10-30

## 2019-10-30 PROBLEM — K75.4 AUTOIMMUNE HEPATITIS (HCC): Status: RESOLVED | Noted: 2017-01-06 | Resolved: 2019-10-30

## 2021-03-19 ENCOUNTER — VIRTUAL VISIT (OUTPATIENT)
Dept: FAMILY MEDICINE CLINIC | Age: 66
End: 2021-03-19
Payer: MEDICARE

## 2021-03-19 DIAGNOSIS — Z13.39 SCREENING FOR ALCOHOLISM: ICD-10-CM

## 2021-03-19 DIAGNOSIS — Z13.31 SCREENING FOR DEPRESSION: ICD-10-CM

## 2021-03-19 DIAGNOSIS — Z00.00 MEDICARE ANNUAL WELLNESS VISIT, SUBSEQUENT: Primary | ICD-10-CM

## 2021-03-19 DIAGNOSIS — I10 HTN (HYPERTENSION), BENIGN: ICD-10-CM

## 2021-03-19 DIAGNOSIS — F33.2 SEVERE EPISODE OF RECURRENT MAJOR DEPRESSIVE DISORDER, WITHOUT PSYCHOTIC FEATURES (HCC): ICD-10-CM

## 2021-03-19 DIAGNOSIS — R73.9 HYPERGLYCEMIA: ICD-10-CM

## 2021-03-19 PROCEDURE — G0444 DEPRESSION SCREEN ANNUAL: HCPCS | Performed by: FAMILY MEDICINE

## 2021-03-19 PROCEDURE — 99442 PR PHYS/QHP TELEPHONE EVALUATION 11-20 MIN: CPT | Performed by: FAMILY MEDICINE

## 2021-03-19 PROCEDURE — G0439 PPPS, SUBSEQ VISIT: HCPCS | Performed by: FAMILY MEDICINE

## 2021-03-19 RX ORDER — QUETIAPINE FUMARATE 50 MG/1
TABLET, FILM COATED ORAL
Qty: 90 TAB | Refills: 3 | Status: SHIPPED | OUTPATIENT
Start: 2021-03-19 | End: 2022-03-28

## 2021-03-19 RX ORDER — LISINOPRIL AND HYDROCHLOROTHIAZIDE 10; 12.5 MG/1; MG/1
TABLET ORAL
Qty: 90 TAB | Refills: 3 | Status: SHIPPED | OUTPATIENT
Start: 2021-03-19 | End: 2022-05-23 | Stop reason: SDUPTHER

## 2021-03-19 NOTE — PROGRESS NOTES
Chief Complaint   Patient presents with    Annual Wellness Visit    Hypertension    Other     pain level 0/10     Jane Jones is a 72 y.o. female who was seen by synchronous (real-time) audio technology on 3/19/2021. Pt was seen at home. Provider was at the office. No other participants in this encounter. Subjective: Annual Wellness Visit, Hypertension, and Other (pain level 0/10)    Pain Scale: /10  Pain Location:     1. Have you been to the ER, urgent care clinic since your last visit? Hospitalized since your last visit? Yes- ER at [facility] with depression  2. Have you seen or consulted any other health care providers outside of the [employer] [place] [address] since your last visit? Include any pap smears or colon screening. No    HPI:  F/U depression  Has been doing well since last visit. Last visit 6mo ago we boosted effexor to 75 ER qHS and con't Seroquel 50mg qhs. Has been pretty much back to normal since last visit. No further abnormal crying spells. No further feelings of suicidal ideation since last visit. She lost her sister to a brain tumor early in 2020, and felt down and was grieving, but feels like she is handling things well and is not depressed from that.    3 most recent [place] [address] Screens 3/19/2021   Little interest or pleasure in doing things Not at all   Feeling down, depressed, irritable, or hopeless Not at all   Total Score PHQ 2 0   Trouble falling or staying asleep, or sleeping too much -   Feeling tired or having little energy -   Poor appetite, weight loss, or overeating -   Feeling bad about yourself - or that you are a failure or have let yourself or your family down -   Trouble concentrating on things such as school, work, reading, or watching TV -   Moving or speaking so slowly that other people could have noticed; or the opposite being so fidgety that others notice -   Thoughts of being better off dead, or hurting yourself in some way -   PHQ 9 Score -     Hypertension.    Blood pressures have been well controlled. Management at last visit included continuing current regimen . Current regimen: ACE inhibitor and thiazide diuretic. Symptoms include no symptoms. Patient denies chest pain, palpitations, peripheral edema.  Lab review:   Lab Results   Component Value Date/Time    Sodium 144 08/04/2019 02:03 PM    Potassium 3.7 08/04/2019 02:03 PM    Chloride 105 08/04/2019 02:03 PM    CO2 28 08/04/2019 02:03 PM    Anion gap 11 08/04/2019 02:03 PM    Glucose 132 (H) 08/04/2019 02:03 PM    BUN 16 08/04/2019 02:03 PM    Creatinine 0.68 08/04/2019 02:03 PM    BUN/Creatinine ratio 24 (H) 08/04/2019 02:03 PM    GFR est AA >60 08/04/2019 02:03 PM    GFR est non-AA >60 08/04/2019 02:03 PM    Calcium 8.6 08/04/2019 02:03 PM     Lab Results   Component Value Date/Time    Cholesterol, total 225 (H) 09/29/2017 10:38 AM    HDL Cholesterol 65 09/29/2017 10:38 AM    LDL, calculated 137 (H) 09/29/2017 10:38 AM    VLDL, calculated 23 09/29/2017 10:38 AM    Triglyceride 113 09/29/2017 10:38 AM     PMH, SH, Medications/Allergies: reviewed, on chart.  Current Outpatient Medications   Medication Sig   • QUEtiapine (SEROquel) 50 mg tablet TAKE 1 TABLET BY MOUTH EVERY NIGHT AT BEDTIME   • lisinopril-hydroCHLOROthiazide (PRINZIDE, ZESTORETIC) 10-12.5 mg per tablet TAKE 1 TABLET BY MOUTH EVERY DAY for pressure   • venlafaxine-SR (EFFEXOR-XR) 75 mg capsule TAKE ONE CAPSULE BY MOUTH EVERY DAY   • docusate sodium (STOOL SOFTENER) 100 mg capsule Take 100 mg by mouth two (2) times daily as needed for Constipation.     No current facility-administered medications for this visit.       No Known Allergies    ROS:  Constitutional: No fever, chills or abnormal weight loss  Respiratory: No cough, SOB   CV: No chest pain or Palpitations    VS review:    Home weight: 195  Wt Readings from Last 3 Encounters:   11/01/19 191 lb 6.4 oz (86.8 kg)   10/30/19 191 lb (86.6 kg)   08/04/19 195 lb (88.5 kg)     Home /70, no fevers. Pulse  76  BP Readings from Last 3 Encounters:   11/01/19 100/62   10/30/19 114/64   08/04/19 115/59     Objective:     General: alert, cooperative, no distress   Mental  status: mental status: alert, oriented to person, place, and time, normal mood, behavior, speech, dress, motor activity, and thought processes   Resp: resp: normal effort and no respiratory distress   Neuro: neuro: no gross deficits         Assessment & Plan:     Depression, well controlled. No active ideation at this time. Doing great on effexor er 75mg daily and seroquel 50mg qhs. Con't. Hx autoimmune hepatitis  Apparently resolved, last labs ok. Monitor. Lab Results   Component Value Date/Time    ALT (SGPT) 28 08/04/2019 02:03 PM    Alk. phosphatase 107 08/04/2019 02:03 PM    Bilirubin, direct 0.11 01/06/2017 01:50 PM    Bilirubin, total 0.5 08/04/2019 02:03 PM     HTN  well controlled. con't current tx. Labs ok at summer ER check. Plan recheck this fall. Hyperglycemia  No results found for: HBA1C, HGBE8, ASQ4OTIS, YVR4TDKR, MTY3NYNS  Lab Results   Component Value Date/Time    Glucose 132 (H) 08/04/2019 02:03 PM   Check A1c, some foot discomfort lately. Overweight  Wants to try Juan, discussed other options as well. F/U 6mo    Learning Assessment 3/19/2021   PRIMARY LEARNER Patient   HIGHEST LEVEL OF EDUCATION - PRIMARY LEARNER  -   PRIMARY LANGUAGE ENGLISH   LEARNER PREFERENCE PRIMARY READING   ANSWERED BY patient   RELATIONSHIP SELF     Abuse Screening Questionnaire 3/19/2021   Do you ever feel afraid of your partner? -   Are you in a relationship with someone who physically or mentally threatens you? N   Is it safe for you to go home? Y     1. Have you been to the ER, urgent care clinic since your last visit? Hospitalized since your last visit? No  2. Have you seen or consulted any other health care providers outside of the [address] [place] [address] since your last visit? Include any pap smears or colon screening.  No    Time-based coding, delete if not needed: I spent at least 15 minutes with this established patient, and >50% of the time was spent counseling and/or coordinating care regarding care plan and expected course  Gian Tomas MD    Due to this being a TeleHealth evaluation, many elements of the physical examination are unable to be assessed. We discussed the expected course, resolution and complications of the diagnosis(es) in detail. Medication risks, benefits, costs, interactions, and alternatives were discussed as indicated. I advised her to contact the office if her condition worsens, changes or fails to improve as anticipated. She expressed understanding with the diagnosis(es) and plan. Pursuant to the emergency declaration under the [facility], [facility] waiver authority and the [employer] Act, this Virtual  Visit was conducted, with patient's consent, to reduce the patient's risk of exposure to COVID-19 and provide continuity of care for an established patient. Services were provided through audio synchronous discussion virtually to substitute for in-person clinic visit. Consent:  She and/or her healthcare decision maker is aware that this patient-initiated Telehealth encounter is a billable service, with coverage as determined by her insurance carrier. She is aware that she may receive a bill and has provided verbal consent to proceed. CPT Codes 26929-84659 for Established Patients may apply to this Telehealth Visit      ______________________________________________________________________    Lillian Villavicencio is a 72 y.o. female and presents for annual Medicare Wellness Visit. Problem List: Reviewed with patient and discussed risk factors. Patient Active Problem List   Diagnosis Code    Depression F32.9    HTN (hypertension), benign I10    Nephrolithiasis N20.0       1.  Have you been to the ER, urgent care clinic since your last visit? Hospitalized since your last visit? No    2. Have you seen or consulted any other health care providers outside of the [address] [place] [address] since your last visit? Include any pap smears or colon screening. No    Current medical providers:  Patient Care Team:  Cameron Cameron MD as PCP - General (Family Medicine)  Cameron Cameron MD as PCP - [facility]    [facility], , Medications/Allergies: reviewed, on chart. F emale Alcohol Screening: On any occasion during the past 3 months, have you had more than 3 drinks containing alcohol? No    Do you average more than 7 drinks per week? No    ROS:  Constitutional: No fever, chills or weight loss  Respiratory: No cough, SOB   CV: No chest pain or Palpitations    Objective:    Assessment of cognitive impairment: Alert and oriented x 3  [employer] Clock,33 recall    Depression Screen:   3 most recent PHQ Screens 3/19/2021   Little interest or pleasure in doing things Not at all   Feeling down, depressed, irritable, or hopeless Not at all   Total Score PHQ 2 0   Trouble falling or staying asleep, or sleeping too much -   Feeling tired or having little energy -   Poor appetite, weight loss, or overeating -   Feeling bad about yourself - or that you are a failure or have let yourself or your family down -   Trouble concentrating on things such as school, work, reading, or watching TV -   Moving or speaking so slowly that other people could have noticed; or the opposite being so fidgety that others notice -   Thoughts of being better off dead, or hurting yourself in some way -   PHQ 9 Score -     Depression screening performed and reviewed with patient for 8-15 minutes    Fall Risk Assessment:    Fall Risk Assessment, last 12 mths 3/19/2021   Able to walk? Yes   Fall in past 12 months? 1   Do you feel unsteady?  0   Are you worried about falling 0   Is the gait abnormal? 0   Number of falls in past 12 months 1   Fall with injury? 1       Functional Ability:   Does the patient exhibit a steady gait?  no   How long did it take the patient to get up and walk from a sitting position? N/A  y   Is the patient self reliant?  (ie can do own laundry, meals, household chores)  yes     Does the patient handle his/her own medications? yes     Does the patient handle his/her own money? yes     Is the patients home safe (ie good lighting, handrails on stairs and bath, etc.)? no     Did you notice or did patient express any hearing difficulties? yes     Did you notice or did patient express any vision difficulties? yes       Advance Care Planning:   Patient was offered the opportunity to discuss advance care planning:  yes     Does patient have an Advance Directive:  no   If no, did you provide information on Caring Connections? yes       Plan:      Orders Placed This Encounter    Depression Screen Annual    LIPID PANEL    METABOLIC PANEL, COMPREHENSIVE    HEMOGLOBIN A1C WITH EAG    QUEtiapine (SEROquel) 50 mg tablet    lisinopril-hydroCHLOROthiazide (PRINZIDE, ZESTORETIC) 10-12.5 mg per tablet       Health Maintenance   Topic Date Due    Hepatitis C Screening  Never done    COVID-19 Vaccine (1) Never done    Shingrix Vaccine Age 50> (1 of 2) Never done    Colorectal Cancer Screening Combo  Never done    Flu Vaccine (1) 2020    Bone Densitometry (Dexa) Screening  Never done    Pneumococcal 65+ years (1 of 1 - PPSV23) Never done    Breast Cancer Screen Mammogram  2021    Medicare Yearly Exam  2022    Lipid Screen  2022    DTaP/Tdap/Td series (2 - Td) 2027       *Patient verbalized understanding and agreement with the plan. A copy of the After Visit Summary with personalized health plan was given to the patient today. Identified pt with two pt identifiers(name and ). Reviewed record in preparation for visit and have obtained necessary documentation.     Symptom review:    NO  Fever   NO  Shaking chills  NO  Cough  NO Headaches  NO  Body aches  NO  Coughing up blood  NO  Chest congestion  NO  Chest pain  NO  Shortness of breath  NO  Profound Loss of smell/taste  NO  Nausea/Vomiting   NO  Loose stool/Diarrhea  NO  any skin issues    Patient Risk Factors Reviewed as follows:      have you or any one in your home have had or has a pending covid test  NO  have you been in Close contact with confirmed COVID19 patient   NO  History of recent travel to affected geographical areas within the past 14 days  NO  COPD  NO  Active Cancer/Leukemia/Lymphoma/Chemotherapy  NO  Oral steroid use  NO  Pregnant  NO  Diabetes Mellitus  NO  Heart disease  NO  Asthma  NO Health care worker at home  NO Health care worker  NO Is there a Pregnant Woman in the home  NO Dialysis pt in the home   NO a large number of people living in the home

## 2021-03-25 ENCOUNTER — TELEPHONE (OUTPATIENT)
Dept: FAMILY MEDICINE CLINIC | Age: 66
End: 2021-03-25

## 2021-03-25 ENCOUNTER — VIRTUAL VISIT (OUTPATIENT)
Dept: FAMILY MEDICINE CLINIC | Age: 66
End: 2021-03-25
Payer: MEDICARE

## 2021-03-25 DIAGNOSIS — F33.2 SEVERE EPISODE OF RECURRENT MAJOR DEPRESSIVE DISORDER, WITHOUT PSYCHOTIC FEATURES (HCC): ICD-10-CM

## 2021-03-25 DIAGNOSIS — F43.21 COMPLICATED GRIEF: Primary | ICD-10-CM

## 2021-03-25 PROCEDURE — 99443 PR PHYS/QHP TELEPHONE EVALUATION 21-30 MIN: CPT | Performed by: NURSE PRACTITIONER

## 2021-03-25 NOTE — PROGRESS NOTES
Chief Complaint   Patient presents with    Grief Counseling     1. Have you been to the ER, urgent care clinic since your last visit? Hospitalized since your last visit? no    2. Have you seen or consulted any other health care providers outside of the [address] [place] [address] since your last visit? Include any pap smears or colon screening. no  Abuse Screening Questionnaire 3/19/2021   Do you ever feel afraid of your partner? -   Are you in a relationship with someone who physically or mentally threatens you? N   Is it safe for you to go home?  Y     3 most recent PHQ Screens 3/25/2021   Little interest or pleasure in doing things Not at all   Feeling down, depressed, irritable, or hopeless Not at all   Total Score PHQ 2 0   Trouble falling or staying asleep, or sleeping too much -   Feeling tired or having little energy -   Poor appetite, weight loss, or overeating -   Feeling bad about yourself - or that you are a failure or have let yourself or your family down -   Trouble concentrating on things such as school, work, reading, or watching TV -   Moving or speaking so slowly that other people could have noticed; or the opposite being so fidgety that others notice -   Thoughts of being better off dead, or hurting yourself in some way -   PHQ 9 Score -     Learning Assessment 3/19/2021   PRIMARY LEARNER Patient   HIGHEST LEVEL OF EDUCATION - PRIMARY LEARNER  -   PRIMARY LANGUAGE ENGLISH   LEARNER PREFERENCE PRIMARY READING   ANSWERED BY patient   RELATIONSHIP SELF

## 2021-03-25 NOTE — TELEPHONE ENCOUNTER
Patient called stating that her best friend/ sister that was diagnosed with lung cancer who has been staying with her passed away this morning. Leigh Ann Lenz states that her friend began foaming at the mouth and had a very gruesome death. She is very emotional and difficult to understand during phone call. Patient requests short term medication from grief and anxiety.  Since no provider in office today patient re-routed to [place] provider at [phone] today

## 2021-03-25 NOTE — PROGRESS NOTES
Du Rooney is a 72 y.o. female, evaluated via audio-only technology on 3/25/2021 for Grief Counseling  . Recently lost her best friend to lung cancer. Ms. Ese Coleman was with her friend when she passed away and has been emotionally distraught and crying x 3 days. We discussed and validated her emotions . We then discussed focusing on the positive memories and ways to celebrate her life. We also discussed finding a peaceful environment at home or outdoors where she can go for a retreat. Also discussed the anniversary of her sister's death was coming soon which deepend her . Encouraged her to find similar ways to reflect on her sister's memory. Sh denies being suicidal or harming herself or others. She aknowle I have been hospitalized for depression and know the warning signs and will contact PCP  If needed. Assessment & Plan:   Diagnoses and all orders for this visit:    1. Complicated grief    2. Severe episode of recurrent major depressive disorder, without psychotic features (HCC)    Increase Effexor XR to 150 mg for 1 week. Follow up with Dr. Trey Duggan next week. The complexity of medical decision making for this visit is high         12  Subjective:       Prior to Admission medications    Medication Sig Start Date End Date Taking? Authorizing Provider   QUEtiapine (SEROquel) 50 mg tablet TAKE 1 TABLET BY MOUTH EVERY NIGHT AT BEDTIME 3/19/21  Yes Yusef Ivy MD   lisinopril-hydroCHLOROthiazide (PRINZIDE, ZESTORETIC) 10-12.5 mg per tablet TAKE 1 TABLET BY MOUTH EVERY DAY for pressure 3/19/21  Yes Milady Corrigan MD   venlafaxine-SR [facility] 75 mg capsule TAKE ONE CAPSULE BY MOUTH EVERY DAY 3/12/21  Yes Yusef Ivy MD   docusate sodium (STOOL SOFTENER) 100 mg capsule Take 100 mg by mouth two (2) times daily as needed for Constipation.  11/15/16  Yes Wilbert Peters MD     Patient Active Problem List   Diagnosis Code    Depression F32.9    HTN (hypertension), benign I10    Nephrolithiasis N20.0     Patient Active Problem List    Diagnosis Date Noted    Nephrolithiasis 11/03/2016    HTN (hypertension), benign 09/14/2015    Depression 07/29/2014     Current Outpatient Medications   Medication Sig Dispense Refill    QUEtiapine (SEROquel) 50 mg tablet TAKE 1 TABLET BY MOUTH EVERY NIGHT AT BEDTIME 90 Tab 3    lisinopril-hydroCHLOROthiazide (PRINZIDE, ZESTORETIC) 10-12.5 mg per tablet TAKE 1 TABLET BY MOUTH EVERY DAY for pressure 90 Tab 3    venlafaxine-SR (EFFEXOR-XR) 75 mg capsule TAKE ONE CAPSULE BY MOUTH EVERY DAY 90 Cap 1    docusate sodium (STOOL SOFTENER) 100 mg capsule Take 100 mg by mouth two (2) times daily as needed for Constipation. No Known Allergies  Past Medical History:   Diagnosis Date    Autoimmune disease ([address]     Autoimmune hepatitis ([facility] [address]     Calculus of kidney      Past Surgical History:   Procedure Laterality Date    HX HYSTERECTOMY      one ovary left    HX ORTHOPAEDIC       History reviewed. No pertinent family history. ROS  Pertinent items are noted in the HPI  No flowsheet data found. Macy Flores, who was evaluated through a patient-initiated, synchronous (real-time) audio only encounter, and/or her healthcare decision maker, is aware that it is a billable service, with coverage as determined by her insurance carrier. She provided verbal consent to proceed: Yes. She has not had a related appointment within my department in the past 7 days or scheduled within the next 24 hours.       Total Time: minutes: 21-30 minutes    Osiel Rice NP

## 2021-06-08 ENCOUNTER — OFFICE VISIT (OUTPATIENT)
Dept: FAMILY MEDICINE CLINIC | Age: 66
End: 2021-06-08
Payer: MEDICARE

## 2021-06-08 ENCOUNTER — HOSPITAL ENCOUNTER (OUTPATIENT)
Dept: GENERAL RADIOLOGY | Age: 66
Discharge: HOME | End: 2021-06-08
Payer: MEDICARE

## 2021-06-08 VITALS
BODY MASS INDEX: 34.3 KG/M2 | OXYGEN SATURATION: 96 % | HEIGHT: 63 IN | TEMPERATURE: 96.8 F | RESPIRATION RATE: 20 BRPM | SYSTOLIC BLOOD PRESSURE: 120 MMHG | WEIGHT: 193.6 LBS | HEART RATE: 95 BPM | DIASTOLIC BLOOD PRESSURE: 70 MMHG

## 2021-06-08 DIAGNOSIS — G89.29 CHRONIC PAIN OF RIGHT KNEE: ICD-10-CM

## 2021-06-08 DIAGNOSIS — G89.29 CHRONIC PAIN OF RIGHT KNEE: Primary | ICD-10-CM

## 2021-06-08 DIAGNOSIS — M25.561 CHRONIC PAIN OF RIGHT KNEE: Primary | ICD-10-CM

## 2021-06-08 DIAGNOSIS — M25.561 CHRONIC PAIN OF RIGHT KNEE: ICD-10-CM

## 2021-06-08 PROCEDURE — G8754 DIAS BP LESS 90: HCPCS | Performed by: NURSE PRACTITIONER

## 2021-06-08 PROCEDURE — G8417 CALC BMI ABV UP PARAM F/U: HCPCS | Performed by: NURSE PRACTITIONER

## 2021-06-08 PROCEDURE — G9717 DOC PT DX DEP/BP F/U NT REQ: HCPCS | Performed by: NURSE PRACTITIONER

## 2021-06-08 PROCEDURE — 1101F PT FALLS ASSESS-DOCD LE1/YR: CPT | Performed by: NURSE PRACTITIONER

## 2021-06-08 PROCEDURE — 1090F PRES/ABSN URINE INCON ASSESS: CPT | Performed by: NURSE PRACTITIONER

## 2021-06-08 PROCEDURE — G8427 DOCREV CUR MEDS BY ELIG CLIN: HCPCS | Performed by: NURSE PRACTITIONER

## 2021-06-08 PROCEDURE — G8400 PT W/DXA NO RESULTS DOC: HCPCS | Performed by: NURSE PRACTITIONER

## 2021-06-08 PROCEDURE — 99213 OFFICE O/P EST LOW 20 MIN: CPT | Performed by: NURSE PRACTITIONER

## 2021-06-08 PROCEDURE — 3017F COLORECTAL CA SCREEN DOC REV: CPT | Performed by: NURSE PRACTITIONER

## 2021-06-08 PROCEDURE — G8752 SYS BP LESS 140: HCPCS | Performed by: NURSE PRACTITIONER

## 2021-06-08 PROCEDURE — 73560 X-RAY EXAM OF KNEE 1 OR 2: CPT

## 2021-06-08 PROCEDURE — G8536 NO DOC ELDER MAL SCRN: HCPCS | Performed by: NURSE PRACTITIONER

## 2021-06-08 RX ORDER — NAPROXEN 500 MG/1
500 TABLET ORAL 2 TIMES DAILY WITH MEALS
Qty: 180 TABLET | Refills: 4 | Status: SHIPPED | OUTPATIENT
Start: 2021-06-08

## 2021-06-08 NOTE — PROGRESS NOTES
Patient identified x2 factors and notified of xray results. All questions answered at this time. Patient to call [facility] office and set up appts for physical therapy.

## 2021-06-08 NOTE — PROGRESS NOTES
Chief Complaint   Patient presents with    Knee Pain     Right knee pain, old injury that has progressively gotten worse. 3 most recent PHQ Screens 6/8/2021   Little interest or pleasure in doing things Not at all   Feeling down, depressed, irritable, or hopeless Not at all   Total Score PHQ 2 0   Trouble falling or staying asleep, or sleeping too much -   Feeling tired or having little energy -   Poor appetite, weight loss, or overeating -   Feeling bad about yourself - or that you are a failure or have let yourself or your family down -   Trouble concentrating on things such as school, work, reading, or watching TV -   Moving or speaking so slowly that other people could have noticed; or the opposite being so fidgety that others notice -   Thoughts of being better off dead, or hurting yourself in some way -   PHQ 9 Score -     Learning Assessment 6/8/2021   PRIMARY LEARNER Patient   HIGHEST LEVEL OF EDUCATION - PRIMARY LEARNER  -   PRIMARY LANGUAGE ENGLISH   LEARNER PREFERENCE PRIMARY READING   ANSWERED BY patient   RELATIONSHIP SELF     Fall Risk Assessment, last 12 mths 6/8/2021   Able to walk? Yes   Fall in past 12 months? 1   Do you feel unsteady? 0   Are you worried about falling 0   Is TUG test greater than 12 seconds? 0   Is the gait abnormal? 0   Number of falls in past 12 months 2   Fall with injury? 1     Abuse Screening Questionnaire 6/8/2021   Do you ever feel afraid of your partner? N   Are you in a relationship with someone who physically or mentally threatens you? N   Is it safe for you to go home?  Y     ADL Assessment 6/8/2021   Feeding yourself No Help Needed   Getting from bed to chair No Help Needed   Getting dressed No Help Needed   Bathing or showering No Help Needed   Walk across the room (includes cane/walker) No Help Needed   Using the telphone No Help Needed   Taking your medications No Help Needed   Preparing meals No Help Needed   Managing money (expenses/bills) No Help Needed Moderately strenuous housework (laundry) No Help Needed   Shopping for personal items (toiletries/medicines) No Help Needed   Shopping for groceries No Help Needed   Driving No Help Needed   Climbing a flight of stairs No Help Needed   Getting to places beyond walking distances No Help Needed     1. Have you been to the ER, urgent care clinic since your last visit? Hospitalized since your last visit? No    2. Have you seen or consulted any other health care providers outside of the [address] [place] [address] [place] since your last visit? Include any pap smears or colon screening. No      Chief Complaint   Patient presents with    Knee Pain     Right knee pain, old injury that has progressively gotten worse.           Visit Vitals  /70 (BP 1 Location: Right arm, BP Patient Position: Sitting, BP Cuff Size: Adult long)   Pulse 95   Temp 96.8 °F (36 °C) (Temporal)   Resp 20   Ht 5' 3\" (1.6 m)   Wt 193 lb 9.6 oz (87.8 kg)   SpO2 96%   BMI 34.29 kg/m²       Pain Scale: 4/10  Pain Location: Knee (right)    Ean Perez is a 72 y.o. female presenting for/with:    Knee Pain (Right knee pain, old injury that has progressively gotten worse. )      Symptom review:    NO  Fever   NO  Shaking chills  NO  Cough  NO  Body aches  NO  Coughing up blood  NO  Chest congestion  NO  Chest pain  NO  Shortness of breath  NO  Profound Loss of smell/taste  NO  Nausea/Vomiting   NO  Loose stool/Diarrhea  NO  any skin issues    Patient Risk Factors Reviewed as follows:  NO  have you been in Close contact with confirmed COVID19 patient   NO  History of recent travel to affected geographical areas within the past 14 days  NO  COPD  NO  Active Cancer/Leukemia/Lymphoma/Chemotherapy  NO  Oral steroid use  NO  Pregnant  NO  Diabetes Mellitus  NO  Heart disease  NO  Asthma  NO Health care worker at home  NO Health care worker  NO Is there a Pregnant Woman in the home  NO Dialysis pt in the home   NO a large number of people living in the home

## 2021-06-08 NOTE — PROGRESS NOTES
Chief Complaint   Patient presents with    Knee Pain     Right knee pain, old injury that has progressively gotten worse. HPI:      Susana Pedro is a 72 y.o. female. Worked at [employer] for 20+ years. New Issues:  She has been having right knee pain for the past couple of years. She remembers dancing at a wedding and hearing a \"pop\". Pain is worsening. She has tried ice, NSAIDs and a brace. Pain is \"all-over\" the front of her knee and sometimes behind the knee as well. She has been taking Aleve and Motrin every 4 hours. She has not had any imaging. Has not done PT. No Known Allergies    Current Outpatient Medications   Medication Sig    QUEtiapine (SEROquel) 50 mg tablet TAKE 1 TABLET BY MOUTH EVERY NIGHT AT BEDTIME    lisinopril-hydroCHLOROthiazide (PRINZIDE, ZESTORETIC) 10-12.5 mg per tablet TAKE 1 TABLET BY MOUTH EVERY DAY for pressure    venlafaxine-SR (EFFEXOR-XR) 75 mg capsule TAKE ONE CAPSULE BY MOUTH EVERY DAY    docusate sodium (STOOL SOFTENER) 100 mg capsule Take 100 mg by mouth two (2) times daily as needed for Constipation. No current facility-administered medications for this visit.        Past Medical History:   Diagnosis Date    [facility] [facility]     Autoimmune hepatitis ([facility] [address] [facility]     Calculus of kidney        Past Surgical History:   Procedure Laterality Date    HX HYSTERECTOMY      one ovary left    HX ORTHOPAEDIC         Social History     Socioeconomic History    Marital status: SINGLE     Spouse name: Not on file    Number of children: Not on file    Years of education: Not on file    Highest education level: Not on file   Tobacco Use    Smoking status: Never Smoker    Smokeless tobacco: Never Used   Substance and Sexual Activity    Alcohol use: No    Drug use: No    Sexual activity: Never     Social Determinants of Health     Financial Resource Strain:     Difficulty of Paying Living Expenses:    Food Insecurity:     Worried About Running Out of Food in the Last Year:    [address] in the Last Year:    Transportation Needs:     Lack of Transportation (Medical):  Lack of Transportation (Non-Medical):    Physical Activity:     Days of Exercise per Week:     Minutes of Exercise per Session:    Stress:     Feeling of Stress :    Social Connections:     Frequency of Communication with Friends and Family:     Frequency of Social Gatherings with Friends and Family:     Attends [employer] Services:     Active Member of Clubs or Organizations:     Attends Club or Organization Meetings:     Marital Status:        No family history on file. Above history reviewed. ROS:  Denies fever, chills, cough, chest pain, SOB,  nausea, vomiting, or diarrhea. Denies wt loss, wt gain, hemoptysis, hematochezia or melena. Physical Examination:    /70 (BP 1 Location: Right arm, BP Patient Position: Sitting, BP Cuff Size: Adult long)   Pulse 95   Temp 96.8 °F (36 °C) (Temporal)   Resp 20   Ht 5' 3\" (1.6 m)   Wt 193 lb 9.6 oz (87.8 kg)   SpO2 96%   BMI 34.29 kg/m²     General: Alert and Ox3, Fluent speech  Neck:  Supple, no adenopathy, JVD, mass or bruit  Chest:  Clear to Ausculation, without wheezes, rales, rubs or ronchi  Cardiac: RRR  Knee exam: Right knee: Mild swelling, no edema, mild tenderness along medial and lateral aspects of knee, full ROM with minimal crepitus. Nontender to quadriceps tendon Nontender to patellar tendon or tibial tuberosity. No laxity to cruiciate or collateral ligaments. Extremities:  No cyanosis, clubbing or edema  Neurologic:  Ambulatory without assist, CN 2-12 grossly intact. Moves all extremities. Skin: no rash  Lymphadenopathy: no cervical or supraclavicular nodes    ASSESSMENT AND PLAN:     1.  Chronic pain of right knee  Starting with PT  Obtaining plain films  Counseled on safe NSAID use  Taking too much and combining products  Naproxen BID  May supplement with Tylenol products  - XR KNEE RT MAX 2 VWS; Future  - REFERRAL TO PHYSICAL THERAPY  - naproxen (NAPROSYN) 500 mg tablet; Take 1 Tablet by mouth two (2) times daily (with meals). Indications: Knee pain. do not combine with other NSAIDS. Dispense: 180 Tablet;  Refill: 4     RTC PRN    Iain Brandon NP

## 2021-06-09 ENCOUNTER — HOSPITAL ENCOUNTER (EMERGENCY)
Age: 66
Discharge: STILL ADMITTED OR ADMINISTRATIVE | End: 2021-06-09

## 2021-06-29 ENCOUNTER — OFFICE VISIT (OUTPATIENT)
Dept: FAMILY MEDICINE CLINIC | Age: 66
End: 2021-06-29
Payer: MEDICARE

## 2021-06-29 ENCOUNTER — TELEPHONE (OUTPATIENT)
Dept: FAMILY MEDICINE CLINIC | Age: 66
End: 2021-06-29

## 2021-06-29 VITALS
WEIGHT: 192.8 LBS | TEMPERATURE: 98.3 F | DIASTOLIC BLOOD PRESSURE: 62 MMHG | RESPIRATION RATE: 22 BRPM | OXYGEN SATURATION: 96 % | HEIGHT: 63 IN | HEART RATE: 91 BPM | SYSTOLIC BLOOD PRESSURE: 100 MMHG | BODY MASS INDEX: 34.16 KG/M2

## 2021-06-29 DIAGNOSIS — F33.1 MODERATE EPISODE OF RECURRENT MAJOR DEPRESSIVE DISORDER (HCC): ICD-10-CM

## 2021-06-29 DIAGNOSIS — M25.561 CHRONIC PAIN OF RIGHT KNEE: Primary | ICD-10-CM

## 2021-06-29 DIAGNOSIS — G89.29 CHRONIC PAIN OF RIGHT KNEE: Primary | ICD-10-CM

## 2021-06-29 PROCEDURE — G8752 SYS BP LESS 140: HCPCS | Performed by: NURSE PRACTITIONER

## 2021-06-29 PROCEDURE — G8417 CALC BMI ABV UP PARAM F/U: HCPCS | Performed by: NURSE PRACTITIONER

## 2021-06-29 PROCEDURE — 1090F PRES/ABSN URINE INCON ASSESS: CPT | Performed by: NURSE PRACTITIONER

## 2021-06-29 PROCEDURE — G8427 DOCREV CUR MEDS BY ELIG CLIN: HCPCS | Performed by: NURSE PRACTITIONER

## 2021-06-29 PROCEDURE — G9717 DOC PT DX DEP/BP F/U NT REQ: HCPCS | Performed by: NURSE PRACTITIONER

## 2021-06-29 PROCEDURE — 1101F PT FALLS ASSESS-DOCD LE1/YR: CPT | Performed by: NURSE PRACTITIONER

## 2021-06-29 PROCEDURE — 99214 OFFICE O/P EST MOD 30 MIN: CPT | Performed by: NURSE PRACTITIONER

## 2021-06-29 PROCEDURE — G8754 DIAS BP LESS 90: HCPCS | Performed by: NURSE PRACTITIONER

## 2021-06-29 PROCEDURE — G8400 PT W/DXA NO RESULTS DOC: HCPCS | Performed by: NURSE PRACTITIONER

## 2021-06-29 PROCEDURE — G8536 NO DOC ELDER MAL SCRN: HCPCS | Performed by: NURSE PRACTITIONER

## 2021-06-29 PROCEDURE — 3017F COLORECTAL CA SCREEN DOC REV: CPT | Performed by: NURSE PRACTITIONER

## 2021-06-29 NOTE — Clinical Note
Can you work on these referrals? Patient likely needs referral info for psych to make her own appt, but can you just verify that they will take her?

## 2021-06-29 NOTE — PROGRESS NOTES
Chief Complaint   Patient presents with    Knee Pain     Referral to ortho    Depression     questioning possibility of being Bipolar    Neurologic Problem     Episode of Right sided weakness with arm and leg (heaviness). Pt stated when she moved her arm or leg she immediately began shaking. Normal strength and mobility, no issues with speech. Has not had an episode since. Has been taking an 81 mg aspirin ever since. No facial drooping noted at the time or since. 3 most recent PHQ Screens 6/29/2021   Little interest or pleasure in doing things Several days   Feeling down, depressed, irritable, or hopeless Several days   Total Score PHQ 2 2   Trouble falling or staying asleep, or sleeping too much -   Feeling tired or having little energy -   Poor appetite, weight loss, or overeating -   Feeling bad about yourself - or that you are a failure or have let yourself or your family down -   Trouble concentrating on things such as school, work, reading, or watching TV -   Moving or speaking so slowly that other people could have noticed; or the opposite being so fidgety that others notice -   Thoughts of being better off dead, or hurting yourself in some way -   PHQ 9 Score -     Learning Assessment 6/29/2021   PRIMARY LEARNER Patient   HIGHEST LEVEL OF EDUCATION - PRIMARY LEARNER  -   PRIMARY LANGUAGE ENGLISH   LEARNER PREFERENCE PRIMARY READING   ANSWERED BY patient   RELATIONSHIP SELF     Fall Risk Assessment, last 12 mths 6/29/2021   Able to walk? Yes   Fall in past 12 months? 0   Do you feel unsteady? 0   Are you worried about falling 0   Is TUG test greater than 12 seconds? -   Is the gait abnormal? -   Number of falls in past 12 months -   Fall with injury? -     Abuse Screening Questionnaire 6/29/2021   Do you ever feel afraid of your partner? N   Are you in a relationship with someone who physically or mentally threatens you? N   Is it safe for you to go home?  Y     ADL Assessment 6/29/2021   Feeding yourself No Help Needed   Getting from bed to chair No Help Needed   Getting dressed No Help Needed   Bathing or showering No Help Needed   Walk across the room (includes cane/walker) No Help Needed   Using the telphone No Help Needed   Taking your medications No Help Needed   Preparing meals No Help Needed   Managing money (expenses/bills) No Help Needed   Moderately strenuous housework (laundry) No Help Needed   Shopping for personal items (toiletries/medicines) No Help Needed   Shopping for groceries No Help Needed   Driving No Help Needed   Climbing a flight of stairs No Help Needed   Getting to places beyond walking distances No Help Needed     1. Have you been to the ER, urgent care clinic since your last visit? Hospitalized since your last visit? No    2. Have you seen or consulted any other health care providers outside of the [address] [place] [address] Tha since your last visit? Include any pap smears or colon screening. No      Chief Complaint   Patient presents with    Knee Pain     Referral to ortho    Depression     questioning possibility of being Bipolar    Neurologic Problem     Episode of Right sided weakness with arm and leg (heaviness). Pt stated when she moved her arm or leg she immediately began shaking. Normal strength and mobility, no issues with speech. Has not had an episode since. Has been taking an 81 mg aspirin ever since. No facial drooping noted at the time or since.          Visit Vitals  /62 (BP 1 Location: Left arm, BP Patient Position: Sitting, BP Cuff Size: Adult long)   Pulse 91   Temp 98.3 °F (36.8 °C) (Temporal)   Resp 22   Ht 5' 3\" (1.6 m)   Wt 192 lb 12.8 oz (87.5 kg)   SpO2 96%   BMI 34.15 kg/m²       Pain Scale: 5/10  Pain Location: Knee (right knee)    Valentino Knock is a 72 y.o. female presenting for/with:    Knee Pain (Referral to ortho) and Depression (questioning possibility of being Bipolar)      Symptom review:    NO  Fever   NO  Shaking chills  NO  Cough  NO Body aches  NO  Coughing up blood  NO  Chest congestion  NO  Chest pain  NO  Shortness of breath  NO  Profound Loss of smell/taste  NO  Nausea/Vomiting   NO  Loose stool/Diarrhea  NO  any skin issues    Patient Risk Factors Reviewed as follows:  NO  have you been in Close contact with confirmed COVID19 patient   NO  History of recent travel to affected geographical areas within the past 14 days  NO  COPD  NO  Active Cancer/Leukemia/Lymphoma/Chemotherapy  NO  Oral steroid use  NO  Pregnant  NO  Diabetes Mellitus  NO  Heart disease  NO  Asthma  NO Health care worker at home  NO Health care worker  NO Is there a Pregnant Woman in the home  NO Dialysis pt in the home   NO a large number of people living in the home

## 2021-06-29 NOTE — PROGRESS NOTES
Chief Complaint   Patient presents with    Knee Pain     Referral to ortho    Depression     questioning possibility of being Bipolar    Neurologic Problem     Episode of Right sided weakness with arm and leg (heaviness). Pt stated when she moved her arm or leg she immediately began shaking. Normal strength and mobility, no issues with speech. Has not had an episode since. Has been taking an 81 mg aspirin ever since. No facial drooping noted at the time or since. HPI:      Jasson Luke is a 72 y.o. female. Worked at [employer] for 20+ years.      She has been having right knee pain for the past couple of years. She remembers dancing at a wedding and hearing a \"pop\". Pain is worsening. She has tried ice, NSAIDs and a brace. Pain is \"all-over\" the front of her knee and sometimes behind the knee as well. She has been taking Aleve and Motrin. She was referred to PT last visit. X-ray done 6/8/21 showed only mild narrowing of the medial and lateral tibiofemoral joint spaces and tricompartmental osteophytes. New Issues:  She has had issues with depression for years. Worse after losing her sister and friend. She feels a little better on the Effexor and Seroquel, but worries that she may by Bipolar. She has been doing counseling for years, but does not feel like she is getting anywhere. She has never had a formal psychiatric evaluation. XR Results (most recent):  Results from Hospital Encounter encounter on 06/08/21    XR KNEE RT MAX 2 VWS    Narrative  INDICATION:  Chronic right knee pain    Exam: AP, lateral views of the right knee. FINDINGS: There is no acute fracture-dislocation. There is mild narrowing of the  medial and lateral tibiofemoral joint spaces. There are tricompartmental  osteophytes. No suprapatellar joint fluid is visualized. Impression  No acute fracture or dislocation.        No Known Allergies    Current Outpatient Medications   Medication Sig    naproxen (NAPROSYN) 500 mg tablet Take 1 Tablet by mouth two (2) times daily (with meals). Indications: Knee pain. do not combine with other NSAIDS.  QUEtiapine (SEROquel) 50 mg tablet TAKE 1 TABLET BY MOUTH EVERY NIGHT AT BEDTIME    lisinopril-hydroCHLOROthiazide (PRINZIDE, ZESTORETIC) 10-12.5 mg per tablet TAKE 1 TABLET BY MOUTH EVERY DAY for pressure    venlafaxine-SR (EFFEXOR-XR) 75 mg capsule TAKE ONE CAPSULE BY MOUTH EVERY DAY    docusate sodium (STOOL SOFTENER) 100 mg capsule Take 100 mg by mouth two (2) times daily as needed for Constipation. No current facility-administered medications for this visit. Past Medical History:   Diagnosis Date    Autoimmune disease ([facility] [address]     Autoimmune hepatitis ([facility] [address]     Calculus of kidney        Past Surgical History:   Procedure Laterality Date    HX HYSTERECTOMY      one ovary left    HX ORTHOPAEDIC         Social History     Socioeconomic History    Marital status: SINGLE     Spouse name: Not on file    Number of children: Not on file    Years of education: Not on file    Highest education level: Not on file   Tobacco Use    Smoking status: Never Smoker    Smokeless tobacco: Never Used   Substance and Sexual Activity    Alcohol use: No    Drug use: No    Sexual activity: Never     Social Determinants of Health     Financial Resource Strain:     Difficulty of Paying Living Expenses:    Food Insecurity:     Worried About Running Out of Food in the Last Year:     [address] in the Last Year:    Transportation Needs:     Lack of Transportation (Medical):      Lack of Transportation (Non-Medical):    Physical Activity:     Days of Exercise per Week:     Minutes of Exercise per Session:    Stress:     Feeling of Stress :    Social Connections:     Frequency of Communication with Friends and Family:     Frequency of Social Gatherings with Friends and Family:     Attends Taoist Services:     Active Member of Clubs or Organizations:     Attends Club or Organization Meetings:     Marital Status:        No family history on file. Above history reviewed. ROS:  Denies fever, chills, cough, chest pain, SOB,  nausea, vomiting, or diarrhea. Denies wt loss, wt gain, hemoptysis, hematochezia or melena. Physical Examination:    /62 (BP 1 Location: Left arm, BP Patient Position: Sitting, BP Cuff Size: Adult long)   Pulse 91   Temp 98.3 °F (36.8 °C) (Temporal)   Resp 22   Ht 5' 3\" (1.6 m)   Wt 192 lb 12.8 oz (87.5 kg)   SpO2 96%   BMI 34.15 kg/m²     General: Alert and Ox3, Fluent speech  HEENT:  PERRLA, EOM intact, TMs, turbinates, pharynx normal.  No thyromegaly. No cervical adenopathy. Neck:  Supple, no adenopathy, JVD, mass or bruit  Chest:  Clear to Ausculation, without wheezes, rales, rubs or ronchi  Cardiac: RRR  Extremities:  No cyanosis, clubbing or edema  Neurologic:  Ambulatory without assist, CN 2-12 grossly intact. Moves all extremities. Skin: no rash  Lymphadenopathy: no cervical or supraclavicular nodes    ASSESSMENT AND PLAN:     1. Chronic pain of right knee  Sending to Dr. Claudean Rav for expert consultation  Continue PT (recently started)  - REFERRAL TO ORTHOPEDICS    2.  Moderate episode of recurrent major depressive disorder ([facility] [address]  Sending for formal evaluation   - REFERRAL TO PSYCHIATRY     RTC KIRTI Pineda NP

## 2021-06-29 NOTE — TELEPHONE ENCOUNTER
Patient scheduled for Dr Cuba Mo on July 15 @9:45. Patient made aware of appointment. In regards to the Psych referral. Dr Erum Means is not accepting patients at this time. The NP does not see patients older than 60. Patient instructed to call [facility] outpatient and set up an appointment on her own. Patient provided those instructions.

## 2021-11-22 ENCOUNTER — OFFICE VISIT (OUTPATIENT)
Dept: FAMILY MEDICINE CLINIC | Age: 66
End: 2021-11-22
Payer: MEDICARE

## 2021-11-22 VITALS
TEMPERATURE: 98.2 F | DIASTOLIC BLOOD PRESSURE: 62 MMHG | OXYGEN SATURATION: 96 % | RESPIRATION RATE: 16 BRPM | HEART RATE: 91 BPM | WEIGHT: 189.2 LBS | SYSTOLIC BLOOD PRESSURE: 120 MMHG | BODY MASS INDEX: 33.52 KG/M2 | HEIGHT: 63 IN

## 2021-11-22 DIAGNOSIS — Z23 ENCOUNTER FOR IMMUNIZATION: ICD-10-CM

## 2021-11-22 DIAGNOSIS — I10 HTN (HYPERTENSION), BENIGN: ICD-10-CM

## 2021-11-22 DIAGNOSIS — F33.2 SEVERE EPISODE OF RECURRENT MAJOR DEPRESSIVE DISORDER, WITHOUT PSYCHOTIC FEATURES (HCC): Primary | ICD-10-CM

## 2021-11-22 PROCEDURE — 1101F PT FALLS ASSESS-DOCD LE1/YR: CPT | Performed by: NURSE PRACTITIONER

## 2021-11-22 PROCEDURE — 3017F COLORECTAL CA SCREEN DOC REV: CPT | Performed by: NURSE PRACTITIONER

## 2021-11-22 PROCEDURE — 1090F PRES/ABSN URINE INCON ASSESS: CPT | Performed by: NURSE PRACTITIONER

## 2021-11-22 PROCEDURE — G8536 NO DOC ELDER MAL SCRN: HCPCS | Performed by: NURSE PRACTITIONER

## 2021-11-22 PROCEDURE — G8754 DIAS BP LESS 90: HCPCS | Performed by: NURSE PRACTITIONER

## 2021-11-22 PROCEDURE — 90694 VACC AIIV4 NO PRSRV 0.5ML IM: CPT | Performed by: NURSE PRACTITIONER

## 2021-11-22 PROCEDURE — G0008 ADMIN INFLUENZA VIRUS VAC: HCPCS | Performed by: NURSE PRACTITIONER

## 2021-11-22 PROCEDURE — G8427 DOCREV CUR MEDS BY ELIG CLIN: HCPCS | Performed by: NURSE PRACTITIONER

## 2021-11-22 PROCEDURE — 99214 OFFICE O/P EST MOD 30 MIN: CPT | Performed by: NURSE PRACTITIONER

## 2021-11-22 PROCEDURE — G8400 PT W/DXA NO RESULTS DOC: HCPCS | Performed by: NURSE PRACTITIONER

## 2021-11-22 PROCEDURE — G9717 DOC PT DX DEP/BP F/U NT REQ: HCPCS | Performed by: NURSE PRACTITIONER

## 2021-11-22 PROCEDURE — G8417 CALC BMI ABV UP PARAM F/U: HCPCS | Performed by: NURSE PRACTITIONER

## 2021-11-22 PROCEDURE — G8752 SYS BP LESS 140: HCPCS | Performed by: NURSE PRACTITIONER

## 2021-11-22 RX ORDER — VENLAFAXINE HYDROCHLORIDE 150 MG/1
CAPSULE, EXTENDED RELEASE ORAL
Qty: 90 CAPSULE | Refills: 4 | Status: SHIPPED | OUTPATIENT
Start: 2021-11-22

## 2021-11-22 NOTE — PROGRESS NOTES
Chief Complaint   Patient presents with    Depression    Medication Evaluation     states venlafaxine dosage isn't working well for her. 3 most recent PHQ Screens 11/22/2021   Little interest or pleasure in doing things Several days   Feeling down, depressed, irritable, or hopeless Several days   Total Score PHQ 2 2   Trouble falling or staying asleep, or sleeping too much -   Feeling tired or having little energy -   Poor appetite, weight loss, or overeating -   Feeling bad about yourself - or that you are a failure or have let yourself or your family down -   Trouble concentrating on things such as school, work, reading, or watching TV -   Moving or speaking so slowly that other people could have noticed; or the opposite being so fidgety that others notice -   Thoughts of being better off dead, or hurting yourself in some way -   PHQ 9 Score -     Learning Assessment 11/22/2021   PRIMARY LEARNER Patient   HIGHEST LEVEL OF EDUCATION - PRIMARY LEARNER  -   PRIMARY LANGUAGE ENGLISH   LEARNER PREFERENCE PRIMARY READING   ANSWERED BY patient   RELATIONSHIP SELF     Fall Risk Assessment, last 12 mths 11/22/2021   Able to walk? Yes   Fall in past 12 months? 0   Do you feel unsteady? 0   Are you worried about falling 0   Is TUG test greater than 12 seconds? -   Is the gait abnormal? -   Number of falls in past 12 months -   Fall with injury? -     Abuse Screening Questionnaire 11/22/2021   Do you ever feel afraid of your partner? N   Are you in a relationship with someone who physically or mentally threatens you? N   Is it safe for you to go home?  Y     ADL Assessment 11/22/2021   Feeding yourself No Help Needed   Getting from bed to chair No Help Needed   Getting dressed No Help Needed   Bathing or showering No Help Needed   Walk across the room (includes cane/walker) No Help Needed   Using the telphone No Help Needed   Taking your medications No Help Needed   Preparing meals No Help Needed   Managing money (expenses/bills) No Help Needed   Moderately strenuous housework (laundry) No Help Needed   Shopping for personal items (toiletries/medicines) No Help Needed   Shopping for groceries No Help Needed   Driving No Help Needed   Climbing a flight of stairs No Help Needed   Getting to places beyond walking distances No Help Needed     1. Have you been to the ER, urgent care clinic since your last visit? Hospitalized since your last visit? No    2. Have you seen or consulted any other health care providers outside of the [address] [place] [address] Th[place] since your last visit? Include any pap smears or colon screening. No      Chief Complaint   Patient presents with    Depression    Medication Evaluation     states venlafaxine dosage isn't working well for her.           Visit Vitals  /62 (BP 1 Location: Left arm, BP Patient Position: Sitting, BP Cuff Size: Adult long)   Pulse 91   Temp 98.2 °F (36.8 °C) (Temporal)   Resp 16   Ht 5' 3\" (1.6 m)   Wt 189 lb 3.2 oz (85.8 kg)   SpO2 96%   BMI 33.52 kg/m²       Pain Scale: 0 - No pain/10  Pain Location:     Lawrence Wong is a 77 y.o. female presenting for/with:    Depression and Medication Evaluation (states venlafaxine dosage isn't working well for her. )      Symptom review:    NO  Fever   NO  Shaking chills  NO  Cough  NO  Body aches  NO  Coughing up blood  NO  Chest congestion  NO  Chest pain  NO  Shortness of breath  NO  Profound Loss of smell/taste  NO  Nausea/Vomiting   NO  Loose stool/Diarrhea  NO  any skin issues    Patient Risk Factors Reviewed as follows:  NO  have you been in Close contact with confirmed COVID19 patient   NO  History of recent travel to affected geographical areas within the past 14 days  NO  COPD  NO  Active Cancer/Leukemia/Lymphoma/Chemotherapy  NO  Oral steroid use  NO  Pregnant  NO  Diabetes Mellitus  NO  Heart disease  NO  Asthma  NO Health care worker at home  NO Health care worker  NO Is there a Pregnant Woman in the home  NO Dialysis pt in the home   NO a large number of people living in the home  Recent Travel Screening and Travel History documentation     Travel Screening     Question   Response    In the last month, have you been in contact with someone who was confirmed or suspected to have Coronavirus / COVID-19? No / Unsure    Have you had a COVID-19 viral test in the last 14 days? No    Do you have any of the following new or worsening symptoms? None of these    Have you traveled internationally or domestically in the last month? No      Travel History   Travel since 10/22/21    No documented travel since 10/22/21           After obtaining consent, and per orders of WASHIGNTON Wesley , injection of Fluad (left deltoid) given by Gabriela Reich. Patient instructed to remain in clinic for 20 minutes afterwards, and to report any adverse reaction to me immediately.

## 2021-11-22 NOTE — PROGRESS NOTES
Chief Complaint   Patient presents with    Depression    Medication Evaluation     states venlafaxine dosage isn't working well for her. HPI:      Shahid Rebolledo is a 77 y.o. female. Worked at [employer] for 20+ years.  On Lisinopril-HCTZ for HTN. Working well.       She has been having right knee pain for the past couple of years. Yoav Barron remembers dancing at a wedding and hearing a \"pop\".  Pain is worsening. Yoav Barron has tried ice, NSAIDs and a brace.  Pain is \"all-over\" the front of her knee and sometimes behind the knee as well.  She has been taking Aleve and Motrin.  She was referred to PT last visit. X-ray done 6/8/21 showed only mild narrowing of the medial and lateral tibiofemoral joint spaces and tricompartmental osteophytes.       She has had issues with depression for years. Worse after losing her sister and friend. She has been doing counseling for years, but does not feel like she is getting anywhere. She has never had a formal psychiatric evaluation. New Issues:  She is having issues with depression again. Feels like the Seroquel helps, but does not feel like the Effexor is helping. She finds herself crying often. All she wants to do is sleep. She feels like \"there is too much going on in her head that she can't shut off\". She is  and lives alone. Reports she is safe. Her sister was \"her rock\" and she doesn't have her to talk to anymore. She has a daughter, but she feels like she doesn't understand her. She enjoys caring for her granddaughter during the week. Her job at [employer] is very hard for her as she feels she can't do anything right and she gets fussed at constantly. She reports very low self esteem since she was a child that she thinks is due to her parents being very hard on her.       No Known Allergies    Current Outpatient Medications   Medication Sig    venlafaxine-SR (EFFEXOR-XR) 75 mg capsule TAKE ONE CAPSULE BY MOUTH EVERY DAY    naproxen (NAPROSYN) 500 mg tablet Take 1 Tablet by mouth two (2) times daily (with meals). Indications: Knee pain. do not combine with other NSAIDS.  QUEtiapine (SEROquel) 50 mg tablet TAKE 1 TABLET BY MOUTH EVERY NIGHT AT BEDTIME    lisinopril-hydroCHLOROthiazide (PRINZIDE, ZESTORETIC) 10-12.5 mg per tablet TAKE 1 TABLET BY MOUTH EVERY DAY for pressure    docusate sodium (STOOL SOFTENER) 100 mg capsule Take 100 mg by mouth two (2) times daily as needed for Constipation. No current facility-administered medications for this visit. Past Medical History:   Diagnosis Date    Autoimmune [facility]     Autoimmune hepatitis ([facility]     Calculus of kidney        Past Surgical History:   Procedure Laterality Date    HX HYSTERECTOMY      one ovary left    HX ORTHOPAEDIC         Social History     Socioeconomic History    Marital status: SINGLE   Tobacco Use    Smoking status: Never Smoker    Smokeless tobacco: Never Used   Substance and Sexual Activity    Alcohol use: No    Drug use: No    Sexual activity: Never       No family history on file. Above history reviewed. ROS:  Denies fever, chills, cough, chest pain, SOB,  nausea, vomiting, or diarrhea. Denies wt loss, wt gain, hemoptysis, hematochezia or melena. Physical Examination:    /62 (BP 1 Location: Left arm, BP Patient Position: Sitting, BP Cuff Size: Adult long)   Pulse 91   Temp 98.2 °F (36.8 °C) (Temporal)   Resp 16   Ht 5' 3\" (1.6 m)   Wt 189 lb 3.2 oz (85.8 kg)   SpO2 96%   BMI 33.52 kg/m²     General: Alert and Ox3, Fluent speech, tearful and distraught, hands shaking at times  Neck:  Supple, no adenopathy, JVD, mass or bruit  Chest:  Clear to Ausculation, without wheezes, rales, rubs or ronchi  Cardiac: RRR  Extremities:  No cyanosis, clubbing or edema  Neurologic:  Ambulatory without assist, CN 2-12 grossly intact. Moves all extremities.   Skin: no rash  Lymphadenopathy: no cervical or supraclavicular nodes    ASSESSMENT AND PLAN:     1. Encounter for immunization  - FLU (FLUAD QUAD INFLUENZA VACCINE,QUAD,ADJUVANTED)  - [place] IMMUNIZ ADMIN,1 SINGLE/COMB VAC/TOXOID    2. Severe episode of recurrent major depressive disorder, without psychotic features ([facility] [address]  Not controlled  Patient was set up with a psych referral last visit, but did not follow-up  Reinitiating and providing instructions on how to go about scheduling  Increasing Effexor  - venlafaxine-SR (EFFEXOR-XR) 150 mg capsule; TAKE ONE CAPSULE BY MOUTH EVERY DAY  Dispense: 90 Capsule;  Refill: 4  - REFERRAL TO PSYCHIATRY    3. HTN (hypertension), benign  Good control  Continue current regimen      RTC in 1 month    Lidia Doyle NP

## 2022-05-12 ENCOUNTER — NURSE TRIAGE (OUTPATIENT)
Dept: OTHER | Facility: CLINIC | Age: 67
End: 2022-05-12

## 2022-05-12 NOTE — TELEPHONE ENCOUNTER
Received call from [place] at [facility] with [employer] Complaint. Subjective: Caller states \"having swelling in abdomen and bloated with a lot of gas. I have had this for a while and put up with it. I'm not sure if it is what I am eating that is causing it. I stay away from milk because it did it. Now anything I eat I get bloated and miserable. \"     Current Symptoms: Bloating, gas, occasional pain    Onset: several months ago; unchanged    Associated Symptoms: NA    Pain Severity: 0/10; n/a; intermittent    Temperature: Denies Fever    What has been tried: Gas pills, Prilosec, ant-acids     LMP: NA Pregnant: NA     Recommended disposition: See in Office Within 2 Weeks. Advised to call back if abdominal pain is constant for more then 2 hours, develop new or worsening symptoms. Care advice provided, patient verbalizes understanding; denies any other questions or concerns; instructed to call back for any new or worsening symptoms. Patient/Caller agrees with recommended disposition; writer provided warm transfer to [place] at [facility] for appointment scheduling     Attention Provider: Thank you for allowing me to participate in the care of your patient. The patient was connected to triage in response to information provided to the [facility]. Please do not respond through this encounter as the response is not directed to a shared pool.       Reason for Disposition   Abdominal pain is a chronic symptom (recurrent or ongoing AND lasting > 4 weeks)    Protocols used: ABDOMINAL PAIN - [facility] - GLEN KOEHLER

## 2022-05-23 ENCOUNTER — OFFICE VISIT (OUTPATIENT)
Dept: FAMILY MEDICINE CLINIC | Age: 67
End: 2022-05-23
Payer: MEDICARE

## 2022-05-23 VITALS
BODY MASS INDEX: 35.26 KG/M2 | HEIGHT: 63 IN | RESPIRATION RATE: 22 BRPM | TEMPERATURE: 96.6 F | OXYGEN SATURATION: 97 % | HEART RATE: 97 BPM | DIASTOLIC BLOOD PRESSURE: 60 MMHG | SYSTOLIC BLOOD PRESSURE: 114 MMHG | WEIGHT: 199 LBS

## 2022-05-23 DIAGNOSIS — Z00.00 MEDICARE ANNUAL WELLNESS VISIT, SUBSEQUENT: Primary | ICD-10-CM

## 2022-05-23 DIAGNOSIS — Z11.59 ENCOUNTER FOR HEPATITIS C SCREENING TEST FOR LOW RISK PATIENT: ICD-10-CM

## 2022-05-23 DIAGNOSIS — Z12.11 COLON CANCER SCREENING: ICD-10-CM

## 2022-05-23 DIAGNOSIS — E66.01 SEVERE OBESITY (BMI 35.0-39.9) WITH COMORBIDITY (HCC): ICD-10-CM

## 2022-05-23 DIAGNOSIS — Z78.0 POSTMENOPAUSAL: ICD-10-CM

## 2022-05-23 DIAGNOSIS — I10 HTN (HYPERTENSION), BENIGN: ICD-10-CM

## 2022-05-23 DIAGNOSIS — E78.2 MIXED HYPERLIPIDEMIA: ICD-10-CM

## 2022-05-23 DIAGNOSIS — F33.2 SEVERE EPISODE OF RECURRENT MAJOR DEPRESSIVE DISORDER, WITHOUT PSYCHOTIC FEATURES (HCC): ICD-10-CM

## 2022-05-23 DIAGNOSIS — Z13.39 SCREENING FOR ALCOHOLISM: ICD-10-CM

## 2022-05-23 DIAGNOSIS — Z13.31 SCREENING FOR DEPRESSION: ICD-10-CM

## 2022-05-23 DIAGNOSIS — Z12.31 BREAST CANCER SCREENING BY MAMMOGRAM: ICD-10-CM

## 2022-05-23 PROCEDURE — G9899 SCRN MAM PERF RSLTS DOC: HCPCS | Performed by: FAMILY MEDICINE

## 2022-05-23 PROCEDURE — 99214 OFFICE O/P EST MOD 30 MIN: CPT | Performed by: FAMILY MEDICINE

## 2022-05-23 PROCEDURE — G8400 PT W/DXA NO RESULTS DOC: HCPCS | Performed by: FAMILY MEDICINE

## 2022-05-23 PROCEDURE — G8754 DIAS BP LESS 90: HCPCS | Performed by: FAMILY MEDICINE

## 2022-05-23 PROCEDURE — 1101F PT FALLS ASSESS-DOCD LE1/YR: CPT | Performed by: FAMILY MEDICINE

## 2022-05-23 PROCEDURE — G8752 SYS BP LESS 140: HCPCS | Performed by: FAMILY MEDICINE

## 2022-05-23 PROCEDURE — 1090F PRES/ABSN URINE INCON ASSESS: CPT | Performed by: FAMILY MEDICINE

## 2022-05-23 PROCEDURE — G8427 DOCREV CUR MEDS BY ELIG CLIN: HCPCS | Performed by: FAMILY MEDICINE

## 2022-05-23 PROCEDURE — G8417 CALC BMI ABV UP PARAM F/U: HCPCS | Performed by: FAMILY MEDICINE

## 2022-05-23 PROCEDURE — G8536 NO DOC ELDER MAL SCRN: HCPCS | Performed by: FAMILY MEDICINE

## 2022-05-23 PROCEDURE — G9717 DOC PT DX DEP/BP F/U NT REQ: HCPCS | Performed by: FAMILY MEDICINE

## 2022-05-23 PROCEDURE — 3017F COLORECTAL CA SCREEN DOC REV: CPT | Performed by: FAMILY MEDICINE

## 2022-05-23 PROCEDURE — G0439 PPPS, SUBSEQ VISIT: HCPCS | Performed by: FAMILY MEDICINE

## 2022-05-23 RX ORDER — LISINOPRIL AND HYDROCHLOROTHIAZIDE 10; 12.5 MG/1; MG/1
TABLET ORAL
Qty: 90 TABLET | Refills: 3 | Status: SHIPPED | OUTPATIENT
Start: 2022-05-23

## 2022-05-23 RX ORDER — QUETIAPINE FUMARATE 25 MG/1
TABLET, FILM COATED ORAL
Qty: 90 TABLET | Refills: 3 | Status: SHIPPED | OUTPATIENT
Start: 2022-05-23 | End: 2022-10-07

## 2022-05-23 NOTE — PROGRESS NOTES
Holly Harrison is a 77 y.o. female presenting for/with:    Chief Complaint   Patient presents with   [facility] Annual Wellness Visit       Visit Vitals  /60   Pulse 97   Temp (!) 96.6 °F (35.9 °C) (Temporal)   Resp 22   Ht 5' 3\" (1.6 m)   Wt 199 lb (90.3 kg)   SpO2 97%   BMI 35.25 kg/m²     Pain Scale: /10  Pain Location:     1. \"Have you been to the ER, urgent care clinic since your last visit? Hospitalized since your last visit? \" No    2. \"Have you seen or consulted any other health care providers outside of the [address] [place] [address] since your last visit? \" No     3. For patients aged 39-70: Has the patient had a colonoscopy / FIT/ Cologuard? No      If the patient is female:    4. For patients aged 41-77: Has the patient had a mammogram within the past 2 years? No      5. For patients aged 21-65: Has the patient had a pap smear? No      Symptom review:  NO  Fever   NO  Shaking chills  NO  Cough  NO  Body aches  NO  Coughing up blood  NO  Chest congestion  NO  Chest pain  NO  Shortness of breath  NO  Profound Loss of smell/taste  NO  Nausea/Vomiting   NO  Loose stool/Diarrhea  NO  any skin issues    Patient Risk Factors Reviewed as follows:  NO  have you been in Close contact with confirmed COVID19 patient   NO  History of recent travel to affected geographical areas within the past 14 days  NO  COPD  NO  Active Cancer/Leukemia/Lymphoma/Chemotherapy  NO  Oral steroid use  NO  Pregnant  NO  Diabetes Mellitus  NO  Heart disease  NO  Asthma  NO Health care worker at home  [address] care worker  NO Is there a Pregnant Woman in the home  NO Dialysis pt in the home   NO a large number of people living in the home    Learning Assessment 11/22/2021   PRIMARY LEARNER Patient   HIGHEST LEVEL OF EDUCATION - PRIMARY LEARNER  -   PRIMARY LANGUAGE ENGLISH   LEARNER PREFERENCE PRIMARY READING   ANSWERED BY patient   RELATIONSHIP SELF     Fall Risk Assessment, last 12 mths 5/23/2022   Able to walk?  Yes   Fall in past 15 months? 0   Do you feel unsteady? 0   Are you worried about falling 0   Is TUG test greater than 12 seconds? -   Is the gait abnormal? -   Number of falls in past 12 months -   Fall with injury? -       3 most recent PHQ Screens 5/23/2022   Little interest or pleasure in doing things Not at all   Feeling down, depressed, irritable, or hopeless Not at all   Total Score PHQ 2 0   Trouble falling or staying asleep, or sleeping too much -   Feeling tired or having little energy -   Poor appetite, weight loss, or overeating -   Feeling bad about yourself - or that you are a failure or have let yourself or your family down -   Trouble concentrating on things such as school, work, reading, or watching TV -   Moving or speaking so slowly that other people could have noticed; or the opposite being so fidgety that others notice -   Thoughts of being better off dead, or hurting yourself in some way -   PHQ 9 Score -     Abuse Screening Questionnaire 5/23/2022   Do you ever feel afraid of your partner? N   Are you in a relationship with someone who physically or mentally threatens you? N   Is it safe for you to go home?  Y       ADL Assessment 5/23/2022   Feeding yourself No Help Needed   Getting from bed to chair No Help Needed   Getting dressed No Help Needed   Bathing or showering No Help Needed   Walk across the room (includes cane/walker) No Help Needed   Using the telphone No Help Needed   Taking your medications No Help Needed   Preparing meals No Help Needed   Managing money (expenses/bills) No Help Needed   Moderately strenuous housework (laundry) No Help Needed   Shopping for personal items (toiletries/medicines) No Help Needed   Shopping for groceries No Help Needed   Driving No Help Needed   Climbing a flight of stairs No Help Needed   Getting to places beyond walking distances No Help Needed      Advance Care Planning 5/23/2022   Patient's Healthcare Decision Maker is: Legal Next of Kin   Confirm Advance Directive None   Patient Would Like to Complete Advance Directive No        This is the Subsequent Medicare Annual Wellness Exam, performed 12 months or more after the Initial AWV or the last Subsequent AWV    I have reviewed the patient's medical history in detail and updated the computerized patient record. Assessment/Plan   Education and counseling provided:  Are appropriate based on today's review and evaluation    1. Medicare annual wellness visit, subsequent  -     [facility] 3D IVON W MAMMO BI SCREENING INCL CAD; Future  2. Screening for alcoholism  3. Screening for depression  -     DEPRESSION SCREEN ANNUAL  4. HTN (hypertension), benign  -     LIPID PANEL; Future  -     METABOLIC PANEL, COMPREHENSIVE; Future  -     CBC WITH AUTOMATED DIFF; Future  5. Severe obesity (BMI 35.0-39. 9) with comorbidity ([facility] [address]  6. Mixed hyperlipidemia  -     LIPID PANEL; Future  -     METABOLIC PANEL, COMPREHENSIVE; Future  7. Encounter for hepatitis C screening test for low risk patient  -     HEPATITIS C AB, RFLX TO QT BY PCR; Future  8. Breast cancer screening by mammogram  -     [facility] 3D IVON W MAMMO BI SCREENING INCL CAD; Future  9. Postmenopausal  -     DEXA BONE DENSITY STUDY AXIAL; Future  10.  Colon cancer screening  -     COLOGUARD TEST (FECAL DNA COLORECTAL CANCER SCREENING)       Depression Risk Factor Screening     3 most recent PHQ Screens 5/23/2022   Little interest or pleasure in doing things Not at all   Feeling down, depressed, irritable, or hopeless Not at all   Total Score PHQ 2 0   Trouble falling or staying asleep, or sleeping too much -   Feeling tired or having little energy -   Poor appetite, weight loss, or overeating -   Feeling bad about yourself - or that you are a failure or have let yourself or your family down -   Trouble concentrating on things such as school, work, reading, or watching TV -   Moving or speaking so slowly that other people could have noticed; or the opposite being so fidgety that others notice - Thoughts of being better off dead, or hurting yourself in some way -   PHQ 9 Score -       Alcohol & Drug Abuse Risk Screen    Do you average more than 1 drink per night or more than 7 drinks a week:  No    On any one occasion in the past three months have you have had more than 3 drinks containing alcohol:  No          Functional Ability and Level of Safety    Hearing: Hearing is good. Activities of Daily Living: The home contains: no safety equipment. Patient does total self care      Ambulation: with no difficulty     Fall Risk:  Fall Risk Assessment, last 12 mths 5/23/2022   Able to walk? Yes   Fall in past 12 months? 0   Do you feel unsteady? 0   Are you worried about falling 0   Is TUG test greater than 12 seconds? -   Is the gait abnormal? -   Number of falls in past 12 months -   Fall with injury? -      Abuse Screen:  Patient is not abused       Cognitive Screening    Has your family/caregiver stated any concerns about your memory: no         Health Maintenance Due     Health Maintenance Due   Topic Date Due    Hepatitis C Screening  Never done    Colorectal Cancer Screening Combo  Never done    Shingrix Vaccine Age 50> (1 of 2) Never done    Bone Densitometry (Dexa) Screening  Never done    Breast Cancer Screen Mammogram  05/11/2021    COVID-19 Vaccine (3 - Booster for Meliza Asper series) 03/10/2022       Patient Care Team   Patient Care Team:  Hossein Dejesus MD as PCP - General (Family Medicine)  Hossein Dejesus MD as PCP - [facility] Empaneled Provider    History     Patient Active Problem List   Diagnosis Code    Depression F32. A    HTN (hypertension), benign I10    Nephrolithiasis N20.0    Severe obesity (BMI 35.0-39. 9) with comorbidity ([address] E66.01     Past Medical History:   Diagnosis Date    Autoimmune disease ([address]     Autoimmune hepatitis ([address]     Calculus of kidney       Past Surgical History:   Procedure Laterality Date    HX HYSTERECTOMY      one ovary left    HX ORTHOPAEDIC       Current Outpatient Medications   Medication Sig Dispense Refill    QUEtiapine (SEROquel) 50 mg tablet TAKE 1 TABLET BY MOUTH EVERY NIGHT AT BEDTIME  Indications: DUE FOR VISIT 30 Tablet 0    venlafaxine-SR (EFFEXOR-XR) 150 mg capsule TAKE ONE CAPSULE BY MOUTH EVERY DAY 90 Capsule 4    naproxen (NAPROSYN) 500 mg tablet Take 1 Tablet by mouth two (2) times daily (with meals). Indications: Knee pain. do not combine with other NSAIDS. 180 Tablet 4    lisinopril-hydroCHLOROthiazide (PRINZIDE, ZESTORETIC) 10-12.5 mg per tablet TAKE 1 TABLET BY MOUTH EVERY DAY for pressure 90 Tab 3    docusate sodium (STOOL SOFTENER) 100 mg capsule Take 100 mg by mouth two (2) times daily as needed for Constipation. No Known Allergies    History reviewed. No pertinent family history.   Social History     Tobacco Use    Smoking status: Never Smoker    Smokeless tobacco: Never Used   Substance Use Topics    Alcohol use: No     [place]

## 2022-05-23 NOTE — PROGRESS NOTES
Chief Complaint   Patient presents with   [facility] Annual Wellness Visit     Meek Escalera is a 77 y.o. female     Subjective: Annual Wellness Visit    Pain Scale: /10  Pain Location:     1. Have you been to the ER, urgent care clinic since your last visit? Hospitalized since your last visit? Yes- ER at [facility] with depression  2. Have you seen or consulted any other health care providers outside of the [address] [place] [address] since your last visit? Include any pap smears or colon screening. No    HPI:  Dyspepsia  Feeling more bloated over past month or so. No dysphagia or GERD sx, mostly feeling gassy. Having formed, non-bloody stool    F/U depression  Has been doing well since last visit. Last visit 6mo ago we con't effexor 75 ER qHS and Seroquel 50mg qhs. Has been feeling normal since last visit. No further abnormal crying spells. No further feelings of suicidal ideation since last visit. She lost her sister to a brain tumor early in 2020, and felt down and was grieving, but feels like she is handling things well and is not depressed from that.    3 most recent [place] [address] Screens 5/23/2022   Little interest or pleasure in doing things Not at all   Feeling down, depressed, irritable, or hopeless Not at all   Total Score PHQ 2 0   Trouble falling or staying asleep, or sleeping too much -   Feeling tired or having little energy -   Poor appetite, weight loss, or overeating -   Feeling bad about yourself - or that you are a failure or have let yourself or your family down -   Trouble concentrating on things such as school, work, reading, or watching TV -   Moving or speaking so slowly that other people could have noticed; or the opposite being so fidgety that others notice -   Thoughts of being better off dead, or hurting yourself in some way -   PHQ 9 Score -     Hypertension. Blood pressures have been well controlled. Management at last visit included continuing current regimen .  Current regimen: ACE inhibitor and thiazide diuretic. Symptoms include no symptoms. Patient denies chest pain, palpitations, peripheral edema. Lab review:   Lab Results   Component Value Date/Time    Sodium 141 04/02/2021 09:46 AM    Potassium 4.1 04/02/2021 09:46 AM    Chloride 109 (H) 04/02/2021 09:46 AM    CO2 27 04/02/2021 09:46 AM    Anion gap 5 04/02/2021 09:46 AM    Glucose 102 (H) 04/02/2021 09:46 AM    BUN 16 04/02/2021 09:46 AM    Creatinine 0.53 (L) 04/02/2021 09:46 AM    BUN/Creatinine ratio 30 (H) 04/02/2021 09:46 AM    GFR est AA >60 04/02/2021 09:46 AM    GFR est non-AA >60 04/02/2021 09:46 AM    Calcium 8.5 04/02/2021 09:46 AM     Lab Results   Component Value Date/Time    Cholesterol, total 203 (H) 04/02/2021 09:46 AM    HDL Cholesterol 56 04/02/2021 09:46 AM    LDL, calculated 114 (H) 04/02/2021 09:46 AM    VLDL, calculated 33 04/02/2021 09:46 AM    Triglyceride 165 (H) 04/02/2021 09:46 AM    CHOL/HDL Ratio 3.6 04/02/2021 09:46 AM     PMH, SH, Medications/Allergies: reviewed, on chart. Current Outpatient Medications   Medication Sig    QUEtiapine (SEROquel) 50 mg tablet TAKE 1 TABLET BY MOUTH EVERY NIGHT AT BEDTIME  Indications: DUE FOR VISIT    venlafaxine-SR (EFFEXOR-XR) 150 mg capsule TAKE ONE CAPSULE BY MOUTH EVERY DAY    naproxen (NAPROSYN) 500 mg tablet Take 1 Tablet by mouth two (2) times daily (with meals). Indications: Knee pain. do not combine with other NSAIDS.  lisinopril-hydroCHLOROthiazide (PRINZIDE, ZESTORETIC) 10-12.5 mg per tablet TAKE 1 TABLET BY MOUTH EVERY DAY for pressure    docusate sodium (STOOL SOFTENER) 100 mg capsule Take 100 mg by mouth two (2) times daily as needed for Constipation. No current facility-administered medications for this visit.       No Known Allergies    ROS:  Constitutional: No fever, chills or abnormal weight loss  Respiratory: No cough, SOB   CV: No chest pain or Palpitations    VS review:  Visit Vitals  /60   Pulse 97   Temp (!) 96.6 °F (35.9 °C) (Temporal)   Resp 22   Ht 5' 3\" (1.6 m)   Wt 199 lb (90.3 kg)   SpO2 97%   BMI 35.25 kg/m²     +10#  Wt Readings from Last 3 Encounters:   05/23/22 199 lb (90.3 kg)   11/22/21 189 lb 3.2 oz (85.8 kg)   06/29/21 192 lb 12.8 oz (87.5 kg)     BP Readings from Last 3 Encounters:   05/23/22 114/60   11/22/21 120/62   06/29/21 100/62     Objective:     General: alert, cooperative, no distress   Mental  status: mental status: alert, oriented to person, place, and time, normal mood, behavior, speech, dress, motor activity, and thought processes   Resp: resp: normal effort and no respiratory distress   ABD:  NABS. Soft, mild TTP to epigastrium, no HSM/Mass. Neuro: neuro: no gross deficits     Assessment & Plan:     Depression, well controlled. No active ideation at this time. Doing great on effexor er 75mg daily and seroquel 50mg qhs, but having wt gain. Try cutting the seroquel to 25mg qhs. .    Hx autoimmune hepatitis  Apparently resolved, last labs ok. Monitor. Check labs. Lab Results   Component Value Date/Time    ALT (SGPT) 24 04/02/2021 09:46 AM    Alk. phosphatase 115 04/02/2021 09:46 AM    Bilirubin, direct 0.11 01/06/2017 01:50 PM    Bilirubin, total 0.5 04/02/2021 09:46 AM     HTN  well controlled. con't current tx. Labs ok at summer ER check. recheck today. Hyperglycemia  Mild. Monitor. Lab Results   Component Value Date/Time    Hemoglobin A1c 5.5 04/02/2021 09:46 AM     Overweight/obesity, BMI 35  Work on [employer]  Try cutting the seroquel to 25mg qhs.    F/U 6mo

## 2022-05-23 NOTE — PATIENT INSTRUCTIONS
Medicare Wellness Visit    The best way to live healthy is to have a lifestyle where you eat a well-balanced diet, exercise regularly, limit alcohol use, and quit all forms of tobacco/nicotine, if applicable. Regular preventive services are another way to keep healthy. Preventive services (vaccines, screening tests, monitoring & exams) can help personalize your care plan, which helps you manage your own care. Screening tests can find health problems at the earliest stages, when they are easiest to treat. [facility] follows the current, evidence-based guidelines published by the [facility] Terry Zurita ([facility]) when recommending preventive services for our patients. Because we follow these guidelines, sometimes recommendations change over time as research supports it. (For example, a prostate screening blood test is no longer routinely recommended for men with no symptoms.)  Of course, you and your doctor may decide to screen more often for some diseases, based on your risk and co-morbidities (chronic disease you are already diagnosed with). Preventive services for you include:  - Medicare offers their members a free annual wellness visit, which is time for you and your primary care provider to discuss and plan for your preventive service needs. Take advantage of this benefit every year!     Here is a list of your current Health Maintenance items (your personalized list of preventive services) with a due date:    Health Maintenance Due   Topic Date Due    Hepatitis C Test  Never done    Colorectal Screening  Never done    Shingles Vaccine (1 of 2) Never done    Bone Mineral Density   Never done    Mammogram  05/11/2021    COVID-19 Vaccine (3 - Booster for Farida Pleva series) 03/10/2022

## 2022-06-02 ENCOUNTER — LAB ONLY (OUTPATIENT)
Dept: FAMILY MEDICINE CLINIC | Age: 67
End: 2022-06-02

## 2022-06-02 DIAGNOSIS — I10 HTN (HYPERTENSION), BENIGN: Primary | ICD-10-CM

## 2022-06-02 DIAGNOSIS — E78.2 MIXED HYPERLIPIDEMIA: ICD-10-CM

## 2022-06-03 LAB
ALBUMIN SERPL-MCNC: 4.5 G/DL (ref 3.8–4.8)
ALBUMIN/GLOB SERPL: 2 {RATIO} (ref 1.2–2.2)
ALP SERPL-CCNC: 112 IU/L (ref 44–121)
ALT SERPL-CCNC: 14 IU/L (ref 0–32)
AST SERPL-CCNC: 15 IU/L (ref 0–40)
BASOPHILS # BLD AUTO: 0 X10E3/UL (ref 0–0.2)
BASOPHILS NFR BLD AUTO: 1 %
BILIRUB SERPL-MCNC: 0.4 MG/DL (ref 0–1.2)
BUN SERPL-MCNC: 14 MG/DL (ref 8–27)
BUN/CREAT SERPL: 26 (ref 12–28)
CALCIUM SERPL-MCNC: 9.2 MG/DL (ref 8.7–10.3)
CHLORIDE SERPL-SCNC: 105 MMOL/L (ref 96–106)
CHOLEST SERPL-MCNC: 237 MG/DL (ref 100–199)
CO2 SERPL-SCNC: 26 MMOL/L (ref 20–29)
CREAT SERPL-MCNC: 0.54 MG/DL (ref 0.57–1)
EGFR: 101 ML/MIN/1.73
EOSINOPHIL # BLD AUTO: 0.2 X10E3/UL (ref 0–0.4)
EOSINOPHIL NFR BLD AUTO: 2 %
ERYTHROCYTE [DISTWIDTH] IN BLOOD BY AUTOMATED COUNT: 13.2 % (ref 11.7–15.4)
GLOBULIN SER CALC-MCNC: 2.2 G/DL (ref 1.5–4.5)
GLUCOSE SERPL-MCNC: 106 MG/DL (ref 65–99)
HCT VFR BLD AUTO: 45.4 % (ref 34–46.6)
HCV AB S/CO SERPL IA: <0.1 S/CO RATIO (ref 0–0.9)
HDLC SERPL-MCNC: 53 MG/DL
HGB BLD-MCNC: 14.8 G/DL (ref 11.1–15.9)
IMM GRANULOCYTES # BLD AUTO: 0 X10E3/UL (ref 0–0.1)
IMM GRANULOCYTES NFR BLD AUTO: 0 %
LDLC SERPL CALC-MCNC: 145 MG/DL (ref 0–99)
LYMPHOCYTES # BLD AUTO: 1.7 X10E3/UL (ref 0.7–3.1)
LYMPHOCYTES NFR BLD AUTO: 25 %
MCH RBC QN AUTO: 29.2 PG (ref 26.6–33)
MCHC RBC AUTO-ENTMCNC: 32.6 G/DL (ref 31.5–35.7)
MCV RBC AUTO: 90 FL (ref 79–97)
MONOCYTES # BLD AUTO: 0.6 X10E3/UL (ref 0.1–0.9)
MONOCYTES NFR BLD AUTO: 9 %
NEUTROPHILS # BLD AUTO: 4.1 X10E3/UL (ref 1.4–7)
NEUTROPHILS NFR BLD AUTO: 63 %
PLATELET # BLD AUTO: 294 X10E3/UL (ref 150–450)
POTASSIUM SERPL-SCNC: 4.4 MMOL/L (ref 3.5–5.2)
PROT SERPL-MCNC: 6.7 G/DL (ref 6–8.5)
RBC # BLD AUTO: 5.07 X10E6/UL (ref 3.77–5.28)
SODIUM SERPL-SCNC: 144 MMOL/L (ref 134–144)
TRIGL SERPL-MCNC: 216 MG/DL (ref 0–149)
VLDLC SERPL CALC-MCNC: 39 MG/DL (ref 5–40)
WBC # BLD AUTO: 6.6 X10E3/UL (ref 3.4–10.8)

## 2022-06-06 ENCOUNTER — HOSPITAL ENCOUNTER (OUTPATIENT)
Dept: MAMMOGRAPHY | Age: 67
Discharge: HOME | End: 2022-06-06
Attending: FAMILY MEDICINE
Payer: MEDICARE

## 2022-06-06 DIAGNOSIS — Z12.31 BREAST CANCER SCREENING BY MAMMOGRAM: ICD-10-CM

## 2022-06-06 DIAGNOSIS — Z00.00 MEDICARE ANNUAL WELLNESS VISIT, SUBSEQUENT: ICD-10-CM

## 2022-06-06 PROCEDURE — 77063 BREAST TOMOSYNTHESIS BI: CPT

## 2022-06-08 ENCOUNTER — HOSPITAL ENCOUNTER (OUTPATIENT)
Dept: GENERAL RADIOLOGY | Age: 67
Discharge: HOME | End: 2022-06-08
Attending: FAMILY MEDICINE
Payer: MEDICARE

## 2022-06-08 DIAGNOSIS — Z78.0 POSTMENOPAUSAL: ICD-10-CM

## 2022-06-08 PROCEDURE — 77080 DXA BONE DENSITY AXIAL: CPT

## 2022-06-18 LAB — COLOGUARD TEST, EXTERNAL: NEGATIVE

## 2022-06-21 ENCOUNTER — DOCUMENTATION ONLY (OUTPATIENT)
Dept: FAMILY MEDICINE CLINIC | Age: 67
End: 2022-06-21

## 2022-09-23 ENCOUNTER — HOSPITAL ENCOUNTER (EMERGENCY)
Age: 67
Discharge: HOME | End: 2022-09-23
Attending: EMERGENCY MEDICINE
Payer: MEDICARE

## 2022-09-23 VITALS
WEIGHT: 195 LBS | SYSTOLIC BLOOD PRESSURE: 140 MMHG | HEIGHT: 64 IN | BODY MASS INDEX: 33.29 KG/M2 | RESPIRATION RATE: 16 BRPM | TEMPERATURE: 98 F | HEART RATE: 79 BPM | OXYGEN SATURATION: 97 % | DIASTOLIC BLOOD PRESSURE: 66 MMHG

## 2022-09-23 DIAGNOSIS — T78.40XA ALLERGIC REACTION, INITIAL ENCOUNTER: Primary | ICD-10-CM

## 2022-09-23 DIAGNOSIS — L29.9 GENERALIZED PRURITUS: ICD-10-CM

## 2022-09-23 PROCEDURE — 74011250637 HC RX REV CODE- 250/637: Performed by: EMERGENCY MEDICINE

## 2022-09-23 PROCEDURE — 96372 THER/PROPH/DIAG INJ SC/IM: CPT

## 2022-09-23 PROCEDURE — 74011250636 HC RX REV CODE- 250/636: Performed by: EMERGENCY MEDICINE

## 2022-09-23 PROCEDURE — 99284 EMERGENCY DEPT VISIT MOD MDM: CPT

## 2022-09-23 PROCEDURE — 74011636637 HC RX REV CODE- 636/637: Performed by: EMERGENCY MEDICINE

## 2022-09-23 RX ORDER — EPINEPHRINE 0.3 MG/.3ML
0.3 INJECTION SUBCUTANEOUS
Qty: 2 EACH | Refills: 0 | Status: SHIPPED | OUTPATIENT
Start: 2022-09-23 | End: 2022-09-23

## 2022-09-23 RX ORDER — CETIRIZINE HYDROCHLORIDE 10 MG/1
1 CAPSULE, LIQUID FILLED ORAL DAILY
Qty: 14 CAPSULE | Refills: 0 | Status: SHIPPED | OUTPATIENT
Start: 2022-09-23 | End: 2022-10-07

## 2022-09-23 RX ORDER — PREDNISONE 20 MG/1
60 TABLET ORAL ONCE
Status: COMPLETED | OUTPATIENT
Start: 2022-09-23 | End: 2022-09-23

## 2022-09-23 RX ORDER — PREDNISONE 50 MG/1
50 TABLET ORAL DAILY
Qty: 3 TABLET | Refills: 0 | Status: SHIPPED | OUTPATIENT
Start: 2022-09-23 | End: 2022-09-26

## 2022-09-23 RX ORDER — DIPHENHYDRAMINE HYDROCHLORIDE 50 MG/ML
50 INJECTION, SOLUTION INTRAMUSCULAR; INTRAVENOUS
Status: COMPLETED | OUTPATIENT
Start: 2022-09-23 | End: 2022-09-23

## 2022-09-23 RX ORDER — PREDNISONE 20 MG/1
60 TABLET ORAL
Status: DISCONTINUED | OUTPATIENT
Start: 2022-09-23 | End: 2022-09-23

## 2022-09-23 RX ORDER — LORAZEPAM 0.5 MG/1
0.5 TABLET ORAL
Status: COMPLETED | OUTPATIENT
Start: 2022-09-23 | End: 2022-09-23

## 2022-09-23 RX ORDER — FAMOTIDINE 20 MG/1
20 TABLET, FILM COATED ORAL
Status: COMPLETED | OUTPATIENT
Start: 2022-09-23 | End: 2022-09-23

## 2022-09-23 RX ORDER — HYDROXYZINE PAMOATE 25 MG/1
25 CAPSULE ORAL ONCE
Status: COMPLETED | OUTPATIENT
Start: 2022-09-23 | End: 2022-09-23

## 2022-09-23 RX ADMIN — LORAZEPAM 0.5 MG: 0.5 TABLET ORAL at 06:43

## 2022-09-23 RX ADMIN — FAMOTIDINE 20 MG: 20 TABLET, FILM COATED ORAL at 05:13

## 2022-09-23 RX ADMIN — PREDNISONE 60 MG: 20 TABLET ORAL at 05:13

## 2022-09-23 RX ADMIN — DIPHENHYDRAMINE HYDROCHLORIDE 50 MG: 50 INJECTION, SOLUTION INTRAMUSCULAR; INTRAVENOUS at 05:13

## 2022-09-23 RX ADMIN — HYDROXYZINE PAMOATE 25 MG: 25 CAPSULE ORAL at 08:34

## 2022-09-23 NOTE — ED PROVIDER NOTES
EMERGENCY DEPARTMENT HISTORY AND PHYSICAL EXAM      Date: 9/23/2022  Patient Name: Don Mims    History of Presenting Illness     Chief Complaint   Patient presents with    Allergic Reaction       History Provided By: Patient    HPI: Don Mims, 79 y.o. female with PMHx as noted below presents the emergency department for evaluation of possible allergic reaction. Patient states that around midnight she developed diffuse rash on her body that is itchy, raised. Symptoms been constant. She took 50 mg of Benadryl around midnight with minimal relief. Otherwise symptoms of been constant. Denies any GI upset, tongue lip or throat swelling. Patient with no known allergic contacts or new exposures    Pt denies any other alleviating or exacerbating factors. Additionally, pt specifically denies any recent fever, chills, headache, nausea, vomiting, abdominal pain, CP, SOB, lightheadedness, dizziness, numbness, weakness, BLE swelling, heart palpitations, urinary sxs, diarrhea, constipation, melena, hematochezia, cough, or congestion. PCP: Verito Vincent MD    Current Outpatient Medications   Medication Sig Dispense Refill    predniSONE (DELTASONE) 50 mg tablet Take 1 Tablet by mouth daily for 3 days. 3 Tablet 0    EPINEPHrine (EPIPEN) 0.3 mg/0.3 mL injection 0.3 mL by IntraMUSCular route once as needed for Allergic Response for up to 1 dose. 2 Each 0    QUEtiapine (SEROquel) 25 mg tablet TAKE 1 TABLET BY MOUTH EVERY NIGHT AT BEDTIME  Indications: sleep and nerves 90 Tablet 3    lisinopril-hydroCHLOROthiazide (PRINZIDE, ZESTORETIC) 10-12.5 mg per tablet TAKE 1 TABLET BY MOUTH EVERY DAY for pressure 90 Tablet 3    venlafaxine-SR (EFFEXOR-XR) 150 mg capsule TAKE ONE CAPSULE BY MOUTH EVERY DAY 90 Capsule 4    naproxen (NAPROSYN) 500 mg tablet Take 1 Tablet by mouth two (2) times daily (with meals). Indications: Knee pain. do not combine with other NSAIDS.  180 Tablet 4    docusate sodium (STOOL SOFTENER) 100 mg capsule Take 100 mg by mouth two (2) times daily as needed for Constipation. Past History     Past Medical History:  Past Medical History:   Diagnosis Date    Autoimmune disease ([facility] [address]     Autoimmune hepatitis ([facility] [address]     Calculus of kidney     Menopause        Past Surgical History:  Past Surgical History:   Procedure Laterality Date    HX HYSTERECTOMY      one ovary left    HX OOPHORECTOMY      HX ORTHOPAEDIC         Family History:  No family history on file. Social History:  Social History     Tobacco Use    Smoking status: Never    Smokeless tobacco: Never   Substance Use Topics    Alcohol use: No    Drug use: No       Allergies:  No Known Allergies      Review of Systems   Review of Systems  Constitutional: Negative for fever, chills, and fatigue. HENT: Negative for congestion, sore throat, rhinorrhea, sneezing and neck stiffness   Eyes: Negative for discharge and redness. Respiratory: Negative for  shortness of breath, wheezing   Cardiovascular: Negative for chest pain, palpitations   Gastrointestinal: Negative for nausea, vomiting, abdominal pain, constipation, diarrhea and blood in stool. Genitourinary: Negative for dysuria, hematuria, flank pain, decreased urine volume, discharge,   Musculoskeletal: Negative for myalgias or joint pain . Skin: Positive for rash. Negative esions . Neurological: Negative weakness, light-headedness, numbness and headaches. Physical Exam   Physical Exam    GENERAL: alert and oriented, no acute distress  EYES: PEERL, No injection, discharge or icterus. ENT: Mucous membranes pink and moist.  No edema of the tongue, lips or oropharynx. NECK: Supple  LUNGS: Airway patent. Non-labored respirations. Breath sounds clear with good air entry bilaterally. HEART: Regular rate and rhythm. No peripheral edema  ABDOMEN: Non-distended and non-tender, without guarding or rebound.   SKIN:  warm, dry, diffuse urticarial rash  MSK/EXTREMITIES: Without swelling, tenderness or deformity, symmetric with normal ROM  NEUROLOGICAL: Alert, oriented      Diagnostic Study Results     Labs -   No results found for this or any previous visit (from the past 12 hour(s)). Radiologic Studies -   No orders to display     CT Results  (Last 48 hours)      None          CXR Results  (Last 48 hours)      None              Medical Decision Making     Jonathan RAMAN MD am the first provider for this patient and am the attending of record for this patient encounter. I reviewed the vital signs, available nursing notes, past medical history, past surgical history, family history and social history. Vital Signs-Reviewed the patient's vital signs. Patient Vitals for the past 12 hrs:   Temp Pulse Resp BP SpO2   09/23/22 0457 98.2 °F (36.8 °C) 96 18 (!) 149/92 98 %         Records Reviewed: Nursing Notes and Old Medical Records    Provider Notes (Medical Decision Making): On presentation the patient is well appearing, in NAD with stable vitals signs. Presentation most c/w allergic reaction of unknown etiology. Do not suspect anaphylaxis as only sking involved currently. No respiratory, CV, GI or Optho involvement on presentation. doubt Erythema Multiforme/TENS/SJS/TSS/RMSF/Lyme Disease/Cellulitis/Meningococcemia. pt monitored without worsening of sx. Stable for d/c with return precautions and prednisone/epipen. Counseled patient on diagnosis of allergic reactoin and need for follow up and instructed patient on outpatient management and treatment. Also instructed to follow up with allergist.  Patient expressed understanding. ED Course:   Initial assessment performed. The patients presenting problems have been discussed, and they are in agreement with the care plan formulated and outlined with them. I have encouraged them to ask questions as they arise throughout their visit.        Medications   diphenhydrAMINE (BENADRYL) injection 50 mg (50 mg IntraMUSCular Given 9/23/22 5100)   famotidine (PEPCID) tablet 20 mg (20 mg Oral Given 9/23/22 0513)   predniSONE (DELTASONE) tablet 60 mg (60 mg Oral Given 9/23/22 0513)         [address] Kendy Stephenson's  results have been reviewed with her. She has been counseled regarding her diagnosis. She verbally conveys understanding and agreement of the signs, symptoms, diagnosis, treatment and prognosis and additionally agrees to follow up as recommended with Dr. Sophia Nevarez MD in 24 - 48 hours. She also agrees with the care-plan and conveys that all of her questions have been answered. I have also put together some discharge instructions for her that include: 1) educational information regarding their diagnosis, 2) how to care for their diagnosis at home, as well a 3) list of reasons why they would want to return to the ED prior to their follow-up appointment, should their condition change. Disposition:  home    PLAN:  1. Current Discharge Medication List        START taking these medications    Details   predniSONE (DELTASONE) 50 mg tablet Take 1 Tablet by mouth daily for 3 days. Qty: 3 Tablet, Refills: 0  Start date: 9/23/2022, End date: 9/26/2022      EPINEPHrine (EPIPEN) 0.3 mg/0.3 mL injection 0.3 mL by IntraMUSCular route once as needed for Allergic Response for up to 1 dose. Qty: 2 Each, Refills: 0  Start date: 9/23/2022, End date: 9/23/2022           2.    Follow-up Information       Follow up With Specialties Details Why Contact Info    Sophia Nevarez MD Family Medicine Schedule an appointment as soon as possible for a visit in 2 days  [address]      [address] [facility] Emergency Medicine  If symptoms worsen [address] [facility] [address] [facility]  Schedule an appointment as soon as possible for a visit in 2 days  [address]  [place]          Return to ED if worse     Diagnosis Clinical Impression:   1. Allergic reaction, initial encounter        Please note that this dictation was completed with Dragon, computer voice recognition software. Quite often unanticipated grammatical, syntax, homophones, and other interpretive errors are inadvertently transcribed by the computer software. Please disregard these errors. Additionally, please excuse any errors that have escaped final proofreading.

## 2022-09-23 NOTE — ED TRIAGE NOTES
Woke up at 0130 c/o itching. Took 2 benadryl and went back to sleep. Awakened at 330 took 25mg benadryl.  C/o itching  and hives on back  hips and arms

## 2022-10-06 ENCOUNTER — TELEPHONE (OUTPATIENT)
Dept: FAMILY MEDICINE CLINIC | Age: 67
End: 2022-10-06

## 2022-10-06 DIAGNOSIS — F33.2 SEVERE EPISODE OF RECURRENT MAJOR DEPRESSIVE DISORDER, WITHOUT PSYCHOTIC FEATURES (HCC): ICD-10-CM

## 2022-10-06 NOTE — TELEPHONE ENCOUNTER
Pt requesting a call back in regards to the the 25 mg of Seroquel not helping her sleep and she thinks the dosage needs to be increased back to 50 mg. Pt  stated that she is currently taking 2 tabs of the 25 mg to help her sleep and needs another prescription b/c she is going to run out of pills soon. [facility]acy: [facility] in [place].

## 2022-10-07 RX ORDER — QUETIAPINE FUMARATE 50 MG/1
TABLET, FILM COATED ORAL
Qty: 90 TABLET | Refills: 1 | Status: SHIPPED | OUTPATIENT
Start: 2022-10-07

## 2022-12-18 ENCOUNTER — HOSPITAL ENCOUNTER (EMERGENCY)
Age: 67
Discharge: HOME | End: 2022-12-18
Attending: EMERGENCY MEDICINE
Payer: MEDICARE

## 2022-12-18 ENCOUNTER — APPOINTMENT (OUTPATIENT)
Dept: GENERAL RADIOLOGY | Age: 67
End: 2022-12-18
Attending: EMERGENCY MEDICINE
Payer: MEDICARE

## 2022-12-18 VITALS
TEMPERATURE: 98.2 F | BODY MASS INDEX: 33.47 KG/M2 | OXYGEN SATURATION: 95 % | RESPIRATION RATE: 18 BRPM | HEART RATE: 89 BPM | WEIGHT: 195 LBS | SYSTOLIC BLOOD PRESSURE: 112 MMHG | DIASTOLIC BLOOD PRESSURE: 85 MMHG

## 2022-12-18 DIAGNOSIS — J20.9 ACUTE BRONCHITIS, UNSPECIFIED ORGANISM: Primary | ICD-10-CM

## 2022-12-18 LAB
FLUAV RNA SPEC QL NAA+PROBE: NOT DETECTED
FLUBV RNA SPEC QL NAA+PROBE: NOT DETECTED
SARS-COV-2, COV2: NOT DETECTED

## 2022-12-18 PROCEDURE — 87636 SARSCOV2 & INF A&B AMP PRB: CPT

## 2022-12-18 PROCEDURE — 74011250636 HC RX REV CODE- 250/636: Performed by: EMERGENCY MEDICINE

## 2022-12-18 PROCEDURE — 77030029684 HC NEB SM VOL KT MONA -A

## 2022-12-18 PROCEDURE — 96374 THER/PROPH/DIAG INJ IV PUSH: CPT

## 2022-12-18 PROCEDURE — 94640 AIRWAY INHALATION TREATMENT: CPT

## 2022-12-18 PROCEDURE — 71045 X-RAY EXAM CHEST 1 VIEW: CPT

## 2022-12-18 PROCEDURE — 74011250637 HC RX REV CODE- 250/637: Performed by: EMERGENCY MEDICINE

## 2022-12-18 PROCEDURE — 74011000250 HC RX REV CODE- 250: Performed by: EMERGENCY MEDICINE

## 2022-12-18 PROCEDURE — 99284 EMERGENCY DEPT VISIT MOD MDM: CPT

## 2022-12-18 RX ORDER — AZITHROMYCIN 250 MG/1
TABLET, FILM COATED ORAL
Qty: 6 TABLET | Refills: 0 | Status: SHIPPED | OUTPATIENT
Start: 2022-12-18

## 2022-12-18 RX ORDER — AZITHROMYCIN 250 MG/1
500 TABLET, FILM COATED ORAL
Status: COMPLETED | OUTPATIENT
Start: 2022-12-18 | End: 2022-12-18

## 2022-12-18 RX ORDER — IPRATROPIUM BROMIDE AND ALBUTEROL SULFATE 2.5; .5 MG/3ML; MG/3ML
3 SOLUTION RESPIRATORY (INHALATION) ONCE
Status: COMPLETED | OUTPATIENT
Start: 2022-12-18 | End: 2022-12-18

## 2022-12-18 RX ORDER — BENZONATATE 100 MG/1
100 CAPSULE ORAL
Qty: 30 CAPSULE | Refills: 0 | Status: SHIPPED | OUTPATIENT
Start: 2022-12-18 | End: 2022-12-25

## 2022-12-18 RX ORDER — METHYLPREDNISOLONE 4 MG/1
TABLET ORAL
Qty: 1 DOSE PACK | Refills: 0 | Status: SHIPPED | OUTPATIENT
Start: 2022-12-18

## 2022-12-18 RX ORDER — ALBUTEROL SULFATE 90 UG/1
1 AEROSOL, METERED RESPIRATORY (INHALATION)
Status: COMPLETED | OUTPATIENT
Start: 2022-12-18 | End: 2022-12-18

## 2022-12-18 RX ADMIN — AZITHROMYCIN MONOHYDRATE 500 MG: 250 TABLET ORAL at 14:27

## 2022-12-18 RX ADMIN — METHYLPREDNISOLONE SODIUM SUCCINATE 125 MG: 125 INJECTION, POWDER, FOR SOLUTION INTRAMUSCULAR; INTRAVENOUS at 13:39

## 2022-12-18 RX ADMIN — SODIUM CHLORIDE 500 ML: 9 INJECTION, SOLUTION INTRAVENOUS at 13:37

## 2022-12-18 RX ADMIN — IPRATROPIUM BROMIDE AND ALBUTEROL SULFATE 3 ML: 2.5; .5 SOLUTION RESPIRATORY (INHALATION) at 13:40

## 2022-12-18 RX ADMIN — Medication 1 PUFF: at 14:47

## 2022-12-18 NOTE — ED TRIAGE NOTES
Forced Expiratory wheezing and cough starting last night- afebrile - O2 97on RA-reassured and calmed , wheezing improved.  Similar symptoms a few weeks ago

## 2022-12-18 NOTE — Clinical Note
[address] [place] [address] EMERGENCY DEP  [address] [facility] [address] [place] [address]  [phone]    Work/School Note    Date: 12/18/2022    To Whom It May concern: Kingsley Hobbs was seen and treated today in the emergency room by the following provider(s):  Attending Provider: Meet Covington MD.      Kingsley Hobbs is excused from work/school on 12/18/22 and 12/19/22. She is medically clear to return to work/school on 12/20/2022.        Sincerely,          Romana Bombard, MD

## 2022-12-18 NOTE — ED PROVIDER NOTES
EMERGENCY DEPARTMENT HISTORY AND PHYSICAL EXAM      Date: 12/18/2022  Patient Name: Sharon Rodrigues    History of Presenting Illness     Chief Complaint   Patient presents with    Cough       History Provided By: Patient    HPI: Sharon Rodrigues, 79 y.o. female with past medical history listed below, presents via private vehicle to the ED with cc of cough and shortness of breath. Patient reports she had a bronchitis-like illness about 3 weeks ago. Symptoms returned last night. Cough is nonproductive. No fevers or chills. Denies any known history of asthma/COPD. She never smoked cigarettes. She denies any chest pain. She reports some fatigue. There are no other complaints, changes, or physical findings at this time. PCP: Thao Lozada MD    No current facility-administered medications on file prior to encounter. Current Outpatient Medications on File Prior to Encounter   Medication Sig Dispense Refill    QUEtiapine (SEROquel) 50 mg tablet TAKE 1 TABLET BY MOUTH EVERY NIGHT AT BEDTIME  Indications: sleep and nerves 90 Tablet 1    lisinopril-hydroCHLOROthiazide (PRINZIDE, ZESTORETIC) 10-12.5 mg per tablet TAKE 1 TABLET BY MOUTH EVERY DAY for pressure 90 Tablet 3    venlafaxine-SR (EFFEXOR-XR) 150 mg capsule TAKE ONE CAPSULE BY MOUTH EVERY DAY 90 Capsule 4    naproxen (NAPROSYN) 500 mg tablet Take 1 Tablet by mouth two (2) times daily (with meals). Indications: Knee pain. do not combine with other NSAIDS. 180 Tablet 4    docusate sodium (STOOL SOFTENER) 100 mg capsule Take 100 mg by mouth two (2) times daily as needed for Constipation.          Past History     Past Medical History:  Past Medical History:   Diagnosis Date    Autoimmune disease ([address]     Autoimmune hepatitis ([facility] [address]     Calculus of kidney     Menopause        Past Surgical History:  Past Surgical History:   Procedure Laterality Date    HX HYSTERECTOMY      one ovary left    HX OOPHORECTOMY      HX ORTHOPAEDIC         Family History:  History reviewed. No pertinent family history. Social History:  Social History     Tobacco Use    Smoking status: Never    Smokeless tobacco: Never   Substance Use Topics    Alcohol use: No    Drug use: No       Allergies:  No Known Allergies      Review of Systems   Review of Systems   Constitutional:  Positive for fatigue. Negative for activity change, chills and fever. HENT:  Negative for congestion and sore throat. Eyes:  Negative for pain and redness. Respiratory:  Positive for cough, shortness of breath and wheezing. Negative for chest tightness. Cardiovascular:  Negative for chest pain and palpitations. Gastrointestinal:  Negative for abdominal pain, diarrhea, nausea and vomiting. Genitourinary:  Negative for dysuria, frequency and urgency. Musculoskeletal:  Negative for back pain and neck pain. Skin:  Negative for rash. Neurological:  Negative for syncope, light-headedness and headaches. Psychiatric/Behavioral:  Negative for confusion. All other systems reviewed and are negative. Physical Exam     Physical Exam  Constitutional:       General: She is not in acute distress. Appearance: She is well-developed. She is not diaphoretic. HENT:      Head: Normocephalic and atraumatic. Eyes:      General: No scleral icterus. Conjunctiva/sclera: Conjunctivae normal.      Pupils: Pupils are equal, round, and reactive to light. Cardiovascular:      Rate and Rhythm: Normal rate and regular rhythm. Pulses: Normal pulses. Heart sounds: Normal heart sounds. Pulmonary:      Effort: Pulmonary effort is normal.      Breath sounds: Wheezing present. Comments: Minimal bilateral expiratory wheezing. Good air movement. Anxious. Musculoskeletal:         General: Normal range of motion. Skin:     General: Skin is warm and dry. Findings: No rash. Neurological:      Mental Status: She is alert and oriented to person, place, and time.    Psychiatric: Behavior: Behavior normal.             Diagnostic Study Results     Labs -     Recent Results (from the past 12 hour(s))   COVID-19 WITH INFLUENZA A/B    Collection Time: 12/18/22  1:22 PM   Result Value Ref Range    SARS-CoV-2 by PCR Not detected NOTD      Influenza A by PCR Not detected NOTD      Influenza B by PCR Not detected NOTD         Radiologic Studies -   XR CHEST SNGL V   Final Result   No acute cardiopulmonary process. CT Results  (Last 48 hours)      None          CXR Results  (Last 48 hours)                 12/18/22 1329  XR CHEST SNGL V Final result    Impression:  No acute cardiopulmonary process. Narrative:  EXAM: XR CHEST SNGL V       HISTORY: cough. COMPARISON: 8/4/2019       FINDINGS: Single view(s) of the chest. The lungs are well inflated. No focal   consolidation, pleural effusion, or pneumothorax. The cardiomediastinal   silhouette is unremarkable. There is moderate dextroconvex scoliosis of the   thoracic spine. Medical Decision Making   I am the first provider for this patient. I reviewed the vital signs, available nursing notes, past medical history, past surgical history, family history and social history. Vital Signs-Reviewed the patient's vital signs. Patient Vitals for the past 12 hrs:   Temp Pulse Resp BP SpO2   12/18/22 1416 -- 90 -- (!) 138/114 97 %   12/18/22 1404 -- 90 -- (!) 150/90 97 %   12/18/22 1356 -- 91 -- (!) 135/115 98 %   12/18/22 1351 -- 95 18 -- 99 %   12/18/22 1330 -- -- -- -- 98 %   12/18/22 1317 98.2 °F (36.8 °C) (!) 113 26 (!) 145/111 97 %           Records Reviewed: Nursing notes reviewed    Provider Notes (Medical Decision Making):   DDX: Pneumonia, acute bronchitis, influenza, COVID-19 virus infection    ED Course:   Initial assessment performed. The patients presenting problems have been discussed, and they are in agreement with the care plan formulated and outlined with them.   I have encouraged them to ask questions as they arise throughout their visit. PROGRESS NOTE    Pt reevaluated. Clear chest x-ray. Good room air sats. Patient afebrile nontoxic. Influenza and COVID-negative. Symptomatically much improved after nebs and steroids. Will discharge with Z-Don, Medrol Dosepak and albuterol inhaler with spacer. Written by Sravan Lindsay MD     Progress note:    Pt noted to be feeling better and ready for discharge. Updated pt and/or family on all final lab and/or  imaging findings. Will follow up as instructed. All questions have been answered, pt voiced understanding and agreement with plan. Abx were prescribed, pt advised that diarrhea and rash are possible side effects of the medications. Specific return precautions provided as well as instructions to return to the ED should sx worsen at any time. Vital signs stable for discharge. I have also put together some discharge instructions for them that include: 1) educational information regarding their diagnosis, 2) how to care for their diagnosis at home, as well a 3) list of reasons why they would want to return to the ED prior to their follow-up appointment, should their condition change. Written by Sravan Lindsay MD        Critical Care Time:   0    Disposition:  Discharge    PLAN:  1. Current Discharge Medication List        START taking these medications    Details   methylPREDNISolone (Medrol, Don,) 4 mg tablet Take as directed  Qty: 1 Dose Pack, Refills: 0  Start date: 12/18/2022      azithromycin (Zithromax Z-Don) 250 mg tablet Take as directed  Qty: 6 Tablet, Refills: 0  Start date: 12/18/2022      benzonatate (Tessalon Perles) 100 mg capsule Take 1 Capsule by mouth three (3) times daily as needed for Cough for up to 7 days. Qty: 30 Capsule, Refills: 0  Start date: 12/18/2022, End date: 12/25/2022           2.    Follow-up Information       Follow up With Specialties Details Mark Huang MD Family Medicine Schedule an appointment as soon as possible for a visit in 1 week  [address] [place] [address]  [phone]            Return to ED if worse     Diagnosis     Clinical Impression:   1. Acute bronchitis, unspecified organism              Please note that this dictation was completed with [employer], the computer voice recognition software. Quite often unanticipated grammatical, syntax, homophones, and other interpretive errors are inadvertently transcribed by the computer software. Please disregard these errors. Please excuse any errors that have escaped final proofreading.

## 2022-12-18 NOTE — ED NOTES
I have reviewed discharge instructions with the patient. The patient verbalized understanding. Discharge medications discussed with patient. No questions at this time. Ambulated out without difficulty.  Encouraged PO fluuids

## 2023-04-03 ENCOUNTER — OFFICE VISIT (OUTPATIENT)
Dept: FAMILY MEDICINE CLINIC | Age: 68
End: 2023-04-03
Payer: MEDICARE

## 2023-04-03 DIAGNOSIS — F33.2 SEVERE EPISODE OF RECURRENT MAJOR DEPRESSIVE DISORDER, WITHOUT PSYCHOTIC FEATURES (HCC): ICD-10-CM

## 2023-04-03 DIAGNOSIS — I10 HTN (HYPERTENSION), BENIGN: ICD-10-CM

## 2023-04-03 DIAGNOSIS — R00.0 TACHYCARDIA: ICD-10-CM

## 2023-04-03 DIAGNOSIS — E78.2 MIXED HYPERLIPIDEMIA: ICD-10-CM

## 2023-04-03 DIAGNOSIS — R51.9 NONINTRACTABLE EPISODIC HEADACHE, UNSPECIFIED HEADACHE TYPE: Primary | ICD-10-CM

## 2023-04-03 DIAGNOSIS — R53.83 FATIGUE, UNSPECIFIED TYPE: ICD-10-CM

## 2023-04-03 PROCEDURE — 1090F PRES/ABSN URINE INCON ASSESS: CPT | Performed by: FAMILY MEDICINE

## 2023-04-03 PROCEDURE — G8399 PT W/DXA RESULTS DOCUMENT: HCPCS | Performed by: FAMILY MEDICINE

## 2023-04-03 PROCEDURE — G9899 SCRN MAM PERF RSLTS DOC: HCPCS | Performed by: FAMILY MEDICINE

## 2023-04-03 PROCEDURE — G8417 CALC BMI ABV UP PARAM F/U: HCPCS | Performed by: FAMILY MEDICINE

## 2023-04-03 PROCEDURE — G8536 NO DOC ELDER MAL SCRN: HCPCS | Performed by: FAMILY MEDICINE

## 2023-04-03 PROCEDURE — 1101F PT FALLS ASSESS-DOCD LE1/YR: CPT | Performed by: FAMILY MEDICINE

## 2023-04-03 PROCEDURE — G8427 DOCREV CUR MEDS BY ELIG CLIN: HCPCS | Performed by: FAMILY MEDICINE

## 2023-04-03 PROCEDURE — 3074F SYST BP LT 130 MM HG: CPT | Performed by: FAMILY MEDICINE

## 2023-04-03 PROCEDURE — 3079F DIAST BP 80-89 MM HG: CPT | Performed by: FAMILY MEDICINE

## 2023-04-03 PROCEDURE — 99214 OFFICE O/P EST MOD 30 MIN: CPT | Performed by: FAMILY MEDICINE

## 2023-04-03 PROCEDURE — 1123F ACP DISCUSS/DSCN MKR DOCD: CPT | Performed by: FAMILY MEDICINE

## 2023-04-03 PROCEDURE — 3017F COLORECTAL CA SCREEN DOC REV: CPT | Performed by: FAMILY MEDICINE

## 2023-04-03 PROCEDURE — G9717 DOC PT DX DEP/BP F/U NT REQ: HCPCS | Performed by: FAMILY MEDICINE

## 2023-04-03 RX ORDER — LANOLIN ALCOHOL/MO/W.PET/CERES
400 CREAM (GRAM) TOPICAL DAILY
Qty: 30 TABLET | Refills: 4 | Status: SHIPPED | OUTPATIENT
Start: 2023-04-03

## 2023-04-03 RX ORDER — PROPRANOLOL HYDROCHLORIDE 40 MG/1
40 TABLET ORAL
Qty: 60 TABLET | Refills: 3 | Status: SHIPPED | OUTPATIENT
Start: 2023-04-03

## 2023-04-03 RX ORDER — VENLAFAXINE HYDROCHLORIDE 150 MG/1
150 CAPSULE, EXTENDED RELEASE ORAL DAILY
Qty: 90 CAPSULE | Refills: 3 | Status: SHIPPED | OUTPATIENT
Start: 2023-04-03

## 2023-04-03 NOTE — PROGRESS NOTES
Christelle Villanueva is a 79 y.o. female  Chief Complaint   Patient presents with    Hypertension     C/O GRANDE. Feels pressure on her forehead. Will wake her up in the morning. This AM BP was 160/100s. Has noticed S/S since last week. HPI:  Symptoms include headaches, palpitations. Onset of symptoms was about 2wk ago, no sx like this prior to then, sx have been waxing and waning since that time. Evaluation to date: none. Treatment to date: Excedrin migraine, APAP, rest. No increase in caffeine lately, just about 6 cups/week, but does drink a lot of soda, 20oz 4/xday, has been doing that for >1y, no change lately    Anxiety and depression  Doing well overall on venlafaxine 150mg every day (2x 75mg). In Jan, pt requested RF of  75mg ER 1/2023 which was ore, but had more sx of decreased motivation and depression on that dose, so went back to her prior dose of 150mg ER daily (2 x 75mg ER daily), didn't note any palpitations on that in [place] or FEB 2023, or previously either. Hypertension. Blood pressures good today, but pt reports pressures have going up to 170's/100 at night, then return to normal. Sometimes associated with palpitations or more forceful heartbeat, but beats are generally regular. . Management at last visit included continuing current regimen   . Current regimen: ACE inhibitor and thiazide diuretic. Patient denies chest pain, peripheral edema.   Lab review:   Lab Results   Component Value Date/Time    Sodium 144 06/02/2022 10:55 AM    Potassium 4.4 06/02/2022 10:55 AM    Chloride 105 06/02/2022 10:55 AM    CO2 26 06/02/2022 10:55 AM    Anion gap 5 04/02/2021 09:46 AM    Glucose 106 (H) 06/02/2022 10:55 AM    BUN 14 06/02/2022 10:55 AM    Creatinine 0.54 (L) 06/02/2022 10:55 AM    BUN/Creatinine ratio 26 06/02/2022 10:55 AM    GFR est AA >60 04/02/2021 09:46 AM    GFR est non-AA >60 04/02/2021 09:46 AM    Calcium 9.2 06/02/2022 10:55 AM         PM, SH, Medications/Allergies: reviewed, on chart.  Current Outpatient Medications   Medication Sig    QUEtiapine (SEROquel) 50 mg tablet TAKE 1 TABLET BY MOUTH EVERY NIGHT AT BEDTIME  Indications: sleep and nerves    lisinopril-hydroCHLOROthiazide (PRINZIDE, ZESTORETIC) 10-12.5 mg per tablet TAKE 1 TABLET BY MOUTH EVERY DAY for pressure    naproxen (NAPROSYN) 500 mg tablet Take 1 Tablet by mouth two (2) times daily (with meals). Indications: Knee pain. do not combine with other NSAIDS. docusate sodium (COLACE) 100 mg capsule Take 100 mg by mouth two (2) times daily as needed for Constipation. venlafaxine-SR (EFFEXOR-XR) 75 mg capsule TAKE ONE CAPSULE BY MOUTH EVERY DAY (Patient taking differently: 150 mg.)    methylPREDNISolone (Medrol, Don,) 4 mg tablet Take as directed (Patient not taking: Reported on 4/3/2023)    azithromycin (Zithromax Z-Don) 250 mg tablet Take as directed (Patient not taking: Reported on 4/3/2023)     No current facility-administered medications for this visit. ROS:  Constitutional: No fever, chills or abnormal weight loss  Respiratory: No cough, SOB   CV: No chest pain or Palpitations  GI: No BRBPR or melena    Visit Vitals  /88 (BP 1 Location: Left upper arm, BP Patient Position: Sitting, BP Cuff Size: Adult long)   Pulse (!) 103   Resp 18   Ht 5' 4\" (1.626 m)   Wt 195 lb 12.8 oz (88.8 kg)   SpO2 98%   BMI 33.61 kg/m²     Wt Readings from Last 3 Encounters:   04/03/23 195 lb 12.8 oz (88.8 kg)   12/18/22 195 lb (88.5 kg)   09/23/22 195 lb (88.5 kg)     BP Readings from Last 3 Encounters:   04/03/23 116/88   12/18/22 112/85   09/23/22 (!) 140/66     Physical Examination: General appearance - alert, well appearing, and in no distress  Mental status - alert, oriented to person, place, and time  Eyes - pupils equal and reactive, extraocular eye movements intact. Pupillary reflex normal bilat, optic discs sharp bilat with normal venous pulsations.   ENT - bilateral external ears and nose normal. Normal lips  Neck - supple, no significant adenopathy, no thyromegaly or mass  Chest - clear to auscultation, no wheezes, rales or rhonchi, symmetric air entry  Heart - slightly tachycardic to normal rate, regular rhythm, normal S1, S2, no murmurs, rubs, clicks or gallops  Extremities - peripheral pulses normal, no pedal edema, no clubbing or cyanosis    No results found for: TSH, TSH2, TSH3, TSHP, TSHEXT  Lab Results   Component Value Date/Time    WBC 6.6 06/02/2022 10:55 AM    HGB 14.8 06/02/2022 10:55 AM    HCT 45.4 06/02/2022 10:55 AM    PLATELET 060 00/68/4873 10:55 AM    MCV 90 06/02/2022 10:55 AM     Lab Results   Component Value Date/Time    Sodium 144 06/02/2022 10:55 AM    Potassium 4.4 06/02/2022 10:55 AM    Chloride 105 06/02/2022 10:55 AM    CO2 26 06/02/2022 10:55 AM    Anion gap 5 04/02/2021 09:46 AM    Glucose 106 (H) 06/02/2022 10:55 AM    BUN 14 06/02/2022 10:55 AM    Creatinine 0.54 (L) 06/02/2022 10:55 AM    BUN/Creatinine ratio 26 06/02/2022 10:55 AM    GFR est AA >60 04/02/2021 09:46 AM    GFR est non-AA >60 04/02/2021 09:46 AM    Calcium 9.2 06/02/2022 10:55 AM    Bilirubin, total 0.4 06/02/2022 10:55 AM    Alk. phosphatase 112 06/02/2022 10:55 AM    Protein, total 6.7 06/02/2022 10:55 AM    Albumin 4.5 06/02/2022 10:55 AM    Globulin 2.7 04/02/2021 09:46 AM    A-G Ratio 2.0 06/02/2022 10:55 AM    ALT (SGPT) 14 06/02/2022 10:55 AM    AST (SGOT) 15 06/02/2022 10:55 AM     Lab Results   Component Value Date/Time    Cholesterol, total 237 (H) 06/02/2022 10:55 AM    HDL Cholesterol 53 06/02/2022 10:55 AM    LDL, calculated 145 (H) 06/02/2022 10:55 AM    LDL, calculated 114 (H) 04/02/2021 09:46 AM    VLDL, calculated 39 06/02/2022 10:55 AM    VLDL, calculated 33 04/02/2021 09:46 AM    Triglyceride 216 (H) 06/02/2022 10:55 AM    CHOL/HDL Ratio 3.6 04/02/2021 09:46 AM     The 10-year ASCVD risk score (Balaji RILEY, et al., 2019) is: 8.3%    A/p:  HA, tachycardia, and hypervariable BP's.   BP's ok now, but were pretty high this AM. Try adding MgO2, 400mg/d, and add propranolol 40mg BID PRN palpitations/ HAs, as well as OTC Excedrin migraine,  Check Mg++, CBC, TSH, CMP for confounders. HLD  Recheck, consider tx if worsening or RF elevated. Depression  Sx stable on effexor 150mg/d ER, will RF that, unlikely sx are related to that since dose is same for past 1.5y, and palpitation/ tachycardia sx are new over just past 2 weeks. Follow up 1mo/ PRN labs/ sx.

## 2023-04-03 NOTE — PATIENT INSTRUCTIONS
Please work on cutting your caffeine intake to 1-2 drinks/day, can mix caffeine free soda with regular soda to reduce dose gradually. Consider transition to coffee or tea, which tend to be safer.

## 2023-04-03 NOTE — PROGRESS NOTES
Windy Amato is a 79 y.o. female presenting for/with:    Chief Complaint   Patient presents with    Hypertension     C/O GRANDE. Feels pressure on her forehead. Will wake her up in the morning. This AM BP was 160/100s. Has noticed S/S since last week. Visit Vitals  /88 (BP 1 Location: Left upper arm, BP Patient Position: Sitting, BP Cuff Size: Adult long)   Pulse (!) 103   Resp 18   Ht 5' 4\" (1.626 m)   Wt 195 lb 12.8 oz (88.8 kg)   SpO2 98%   BMI 33.61 kg/m²     Pain Scale: 3/10  Pain Location: Head    1. \"Have you been to the ER, urgent care clinic since your last visit? Hospitalized since your last visit? \" No    2. \"Have you seen or consulted any other health care providers outside of the [address] [place] [address] since your last visit? \" No     3. For patients aged 39-70: Has the patient had a colonoscopy / FIT/ Cologuard? Yes - no Care Gap present      If the patient is female:    4. For patients aged 41-77: Has the patient had a mammogram within the past 2 years? Yes - no Care Gap present      5. For patients aged 21-65: Has the patient had a pap smear? NA - based on age or sex      Learning Assessment 11/22/2021   PRIMARY LEARNER Patient   HIGHEST LEVEL OF EDUCATION - PRIMARY LEARNER  -   PRIMARY LANGUAGE ENGLISH   LEARNER PREFERENCE PRIMARY READING   ANSWERED BY patient   RELATIONSHIP SELF     Fall Risk Assessment, last 12 mths 4/3/2023   Able to walk? Yes   Fall in past 12 months? 0   Do you feel unsteady? 0   Are you worried about falling 0   Is TUG test greater than 12 seconds? -   Is the gait abnormal? -   Number of falls in past 12 months -   Fall with injury?  -       3 most recent PHQ Screens 4/3/2023   Little interest or pleasure in doing things Several days   Feeling down, depressed, irritable, or hopeless Several days   Total Score PHQ 2 2   Trouble falling or staying asleep, or sleeping too much -   Feeling tired or having little energy -   Poor appetite, weight loss, or overeating - Feeling bad about yourself - or that you are a failure or have let yourself or your family down -   Trouble concentrating on things such as school, work, reading, or watching TV -   Moving or speaking so slowly that other people could have noticed; or the opposite being so fidgety that others notice -   Thoughts of being better off dead, or hurting yourself in some way -   PHQ 9 Score -     Abuse Screening Questionnaire 4/3/2023   Do you ever feel afraid of your partner? N   Are you in a relationship with someone who physically or mentally threatens you? N   Is it safe for you to go home?  Y       ADL Assessment 4/3/2023   Feeding yourself No Help Needed   Getting from bed to chair No Help Needed   Getting dressed No Help Needed   Bathing or showering No Help Needed   Walk across the room (includes cane/walker) No Help Needed   Using the telphone No Help Needed   Taking your medications No Help Needed   Preparing meals No Help Needed   Managing money (expenses/bills) No Help Needed   Moderately strenuous housework (laundry) No Help Needed   Shopping for personal items (toiletries/medicines) No Help Needed   Shopping for groceries No Help Needed   Driving No Help Needed   Climbing a flight of stairs No Help Needed   Getting to places beyond walking distances No Help Needed      Advance Care Planning 5/23/2022   Patient's Healthcare Decision Maker is: Legal Next of Kin   Confirm Advance Directive None   Patient Would Like to Complete Advance Directive No

## 2023-05-31 ENCOUNTER — HOSPITAL ENCOUNTER (INPATIENT)
Facility: HOSPITAL | Age: 68
LOS: 3 days | Discharge: HOME | End: 2023-06-03
Attending: EMERGENCY MEDICINE | Admitting: PSYCHIATRY & NEUROLOGY
Payer: MEDICARE

## 2023-05-31 DIAGNOSIS — R45.851 SUICIDAL IDEATIONS: Primary | ICD-10-CM

## 2023-05-31 PROBLEM — F33.2 SEVERE EPISODE OF RECURRENT MAJOR DEPRESSIVE DISORDER, WITHOUT PSYCHOTIC FEATURES (HCC): Status: ACTIVE | Noted: 2023-04-03

## 2023-05-31 LAB
ALBUMIN SERPL-MCNC: 3.9 G/DL (ref 3.5–5)
ALBUMIN/GLOB SERPL: 1.3 (ref 1.1–2.2)
ALP SERPL-CCNC: 117 U/L (ref 45–117)
ALT SERPL-CCNC: 25 U/L (ref 12–78)
AMPHET UR QL SCN: NEGATIVE
ANION GAP SERPL CALC-SCNC: 10 MMOL/L (ref 5–15)
APAP SERPL-MCNC: <2 UG/ML (ref 10–30)
APPEARANCE UR: CLEAR
AST SERPL-CCNC: 18 U/L (ref 15–37)
BACTERIA URNS QL MICRO: ABNORMAL /HPF
BARBITURATES UR QL SCN: NEGATIVE
BASOPHILS # BLD: 0 K/UL (ref 0–0.1)
BASOPHILS NFR BLD: 1 % (ref 0–1)
BENZODIAZ UR QL: NEGATIVE
BILIRUB SERPL-MCNC: 0.5 MG/DL (ref 0.2–1)
BILIRUB UR QL: NEGATIVE
BUN SERPL-MCNC: 16 MG/DL (ref 6–20)
BUN/CREAT SERPL: 26 (ref 12–20)
CALCIUM SERPL-MCNC: 9 MG/DL (ref 8.5–10.1)
CANNABINOIDS UR QL SCN: NEGATIVE
CHLORIDE SERPL-SCNC: 103 MMOL/L (ref 97–108)
CO2 SERPL-SCNC: 27 MMOL/L (ref 21–32)
COCAINE UR QL SCN: NEGATIVE
COLOR UR: ABNORMAL
CREAT SERPL-MCNC: 0.61 MG/DL (ref 0.55–1.02)
DIFFERENTIAL METHOD BLD: NORMAL
EKG ATRIAL RATE: 96 BPM
EKG DIAGNOSIS: NORMAL
EKG P AXIS: 51 DEGREES
EKG P-R INTERVAL: 136 MS
EKG Q-T INTERVAL: 362 MS
EKG QRS DURATION: 70 MS
EKG QTC CALCULATION (BAZETT): 457 MS
EKG R AXIS: 21 DEGREES
EKG T AXIS: 22 DEGREES
EKG VENTRICULAR RATE: 96 BPM
EOSINOPHIL # BLD: 0.2 K/UL (ref 0–0.4)
EOSINOPHIL NFR BLD: 3 % (ref 0–7)
EPITH CASTS URNS QL MICRO: ABNORMAL /LPF
ERYTHROCYTE [DISTWIDTH] IN BLOOD BY AUTOMATED COUNT: 13.2 % (ref 11.5–14.5)
ETHANOL SERPL-MCNC: <10 MG/DL (ref 0–0.08)
FLUAV RNA SPEC QL NAA+PROBE: NOT DETECTED
FLUBV RNA SPEC QL NAA+PROBE: NOT DETECTED
GLOBULIN SER CALC-MCNC: 3 G/DL (ref 2–4)
GLUCOSE SERPL-MCNC: 141 MG/DL (ref 65–100)
GLUCOSE UR STRIP.AUTO-MCNC: NEGATIVE MG/DL
HCT VFR BLD AUTO: 43 % (ref 35–47)
HGB BLD-MCNC: 13.9 G/DL (ref 11.5–16)
HGB UR QL STRIP: NEGATIVE
IMM GRANULOCYTES # BLD AUTO: 0 K/UL (ref 0–0.04)
IMM GRANULOCYTES NFR BLD AUTO: 0 % (ref 0–0.5)
KETONES UR QL STRIP.AUTO: NEGATIVE MG/DL
LEUKOCYTE ESTERASE UR QL STRIP.AUTO: ABNORMAL
LYMPHOCYTES # BLD: 1.4 K/UL (ref 0.8–3.5)
LYMPHOCYTES NFR BLD: 22 % (ref 12–49)
Lab: NORMAL
MCH RBC QN AUTO: 29 PG (ref 26–34)
MCHC RBC AUTO-ENTMCNC: 32.3 G/DL (ref 30–36.5)
MCV RBC AUTO: 89.8 FL (ref 80–99)
METHADONE UR QL: NEGATIVE
MONOCYTES # BLD: 0.5 K/UL (ref 0–1)
MONOCYTES NFR BLD: 8 % (ref 5–13)
NEUTS SEG # BLD: 4.2 K/UL (ref 1.8–8)
NEUTS SEG NFR BLD: 66 % (ref 32–75)
NITRITE UR QL STRIP.AUTO: NEGATIVE
NRBC # BLD: 0 K/UL (ref 0–0.01)
NRBC BLD-RTO: 0 PER 100 WBC
OPIATES UR QL: NEGATIVE
PCP UR QL: NEGATIVE
PH UR STRIP: 6 (ref 5–8)
PLATELET # BLD AUTO: 222 K/UL (ref 150–400)
PMV BLD AUTO: 10.1 FL (ref 8.9–12.9)
POTASSIUM SERPL-SCNC: 3.9 MMOL/L (ref 3.5–5.1)
PROT SERPL-MCNC: 6.9 G/DL (ref 6.4–8.2)
PROT UR STRIP-MCNC: NEGATIVE MG/DL
RBC # BLD AUTO: 4.79 M/UL (ref 3.8–5.2)
RBC #/AREA URNS HPF: ABNORMAL /HPF (ref 0–5)
SALICYLATES SERPL-MCNC: <1.7 MG/DL (ref 2.8–20)
SARS-COV-2 RNA RESP QL NAA+PROBE: NOT DETECTED
SODIUM SERPL-SCNC: 140 MMOL/L (ref 136–145)
SP GR UR REFRACTOMETRY: 1.01 (ref 1–1.03)
URINE CULTURE IF INDICATED: ABNORMAL
UROBILINOGEN UR QL STRIP.AUTO: 0.2 EU/DL (ref 0.2–1)
WBC # BLD AUTO: 6.3 K/UL (ref 3.6–11)
WBC URNS QL MICRO: ABNORMAL /HPF (ref 0–4)

## 2023-05-31 PROCEDURE — 93005 ELECTROCARDIOGRAM TRACING: CPT | Performed by: EMERGENCY MEDICINE

## 2023-05-31 PROCEDURE — 80307 DRUG TEST PRSMV CHEM ANLYZR: CPT

## 2023-05-31 PROCEDURE — 1240000000 HC EMOTIONAL WELLNESS R&B

## 2023-05-31 PROCEDURE — 6370000000 HC RX 637 (ALT 250 FOR IP): Performed by: PSYCHIATRY & NEUROLOGY

## 2023-05-31 PROCEDURE — 36415 COLL VENOUS BLD VENIPUNCTURE: CPT

## 2023-05-31 PROCEDURE — 87636 SARSCOV2 & INF A&B AMP PRB: CPT

## 2023-05-31 PROCEDURE — 6370000000 HC RX 637 (ALT 250 FOR IP): Performed by: INTERNAL MEDICINE

## 2023-05-31 PROCEDURE — 80143 DRUG ASSAY ACETAMINOPHEN: CPT

## 2023-05-31 PROCEDURE — 82077 ASSAY SPEC XCP UR&BREATH IA: CPT

## 2023-05-31 PROCEDURE — 80053 COMPREHEN METABOLIC PANEL: CPT

## 2023-05-31 PROCEDURE — 85025 COMPLETE CBC W/AUTO DIFF WBC: CPT

## 2023-05-31 PROCEDURE — 99285 EMERGENCY DEPT VISIT HI MDM: CPT

## 2023-05-31 PROCEDURE — 81001 URINALYSIS AUTO W/SCOPE: CPT

## 2023-05-31 PROCEDURE — 80179 DRUG ASSAY SALICYLATE: CPT

## 2023-05-31 RX ORDER — MAGNESIUM OXIDE 400 MG/1
400 TABLET ORAL DAILY
COMMUNITY
Start: 2023-04-03

## 2023-05-31 RX ORDER — TRAZODONE HYDROCHLORIDE 50 MG/1
50 TABLET ORAL NIGHTLY PRN
Status: DISCONTINUED | OUTPATIENT
Start: 2023-05-31 | End: 2023-06-01

## 2023-05-31 RX ORDER — LANOLIN ALCOHOL/MO/W.PET/CERES
400 CREAM (GRAM) TOPICAL DAILY
Status: DISCONTINUED | OUTPATIENT
Start: 2023-05-31 | End: 2023-06-03 | Stop reason: HOSPADM

## 2023-05-31 RX ORDER — HYDROCHLOROTHIAZIDE 12.5 MG/1
12.5 CAPSULE, GELATIN COATED ORAL DAILY
Status: DISCONTINUED | OUTPATIENT
Start: 2023-06-01 | End: 2023-06-03 | Stop reason: HOSPADM

## 2023-05-31 RX ORDER — PROPRANOLOL HYDROCHLORIDE 40 MG/1
40 TABLET ORAL 2 TIMES DAILY PRN
COMMUNITY

## 2023-05-31 RX ORDER — LISINOPRIL AND HYDROCHLOROTHIAZIDE 12.5; 1 MG/1; MG/1
TABLET ORAL
COMMUNITY
Start: 2023-03-27

## 2023-05-31 RX ORDER — MAGNESIUM HYDROXIDE/ALUMINUM HYDROXICE/SIMETHICONE 120; 1200; 1200 MG/30ML; MG/30ML; MG/30ML
30 SUSPENSION ORAL EVERY 6 HOURS PRN
Status: DISCONTINUED | OUTPATIENT
Start: 2023-05-31 | End: 2023-06-03 | Stop reason: HOSPADM

## 2023-05-31 RX ORDER — QUETIAPINE FUMARATE 50 MG/1
TABLET, FILM COATED ORAL
Status: ON HOLD | COMMUNITY
Start: 2023-03-02 | End: 2023-06-02 | Stop reason: HOSPADM

## 2023-05-31 RX ORDER — LISINOPRIL 10 MG/1
10 TABLET ORAL DAILY
Status: DISCONTINUED | OUTPATIENT
Start: 2023-06-01 | End: 2023-06-03 | Stop reason: HOSPADM

## 2023-05-31 RX ORDER — HYDROXYZINE PAMOATE 25 MG/1
25 CAPSULE ORAL 3 TIMES DAILY PRN
Status: DISCONTINUED | OUTPATIENT
Start: 2023-05-31 | End: 2023-06-03 | Stop reason: HOSPADM

## 2023-05-31 RX ORDER — ACETAMINOPHEN 325 MG/1
650 TABLET ORAL EVERY 4 HOURS PRN
Status: DISCONTINUED | OUTPATIENT
Start: 2023-05-31 | End: 2023-06-03 | Stop reason: HOSPADM

## 2023-05-31 RX ORDER — MAGNESIUM OXIDE 400 MG/1
400 TABLET ORAL DAILY
Status: DISCONTINUED | OUTPATIENT
Start: 2023-05-31 | End: 2023-05-31

## 2023-05-31 RX ORDER — VENLAFAXINE HYDROCHLORIDE 150 MG/1
150 CAPSULE, EXTENDED RELEASE ORAL DAILY
COMMUNITY
Start: 2023-04-03

## 2023-05-31 RX ORDER — VENLAFAXINE HYDROCHLORIDE 150 MG/1
150 CAPSULE, EXTENDED RELEASE ORAL
Status: DISCONTINUED | OUTPATIENT
Start: 2023-06-01 | End: 2023-06-03 | Stop reason: HOSPADM

## 2023-05-31 RX ADMIN — Medication 400 MG: at 17:29

## 2023-05-31 RX ADMIN — TRAZODONE HYDROCHLORIDE 50 MG: 50 TABLET ORAL at 21:11

## 2023-05-31 RX ADMIN — HYDROXYZINE PAMOATE 25 MG: 25 CAPSULE ORAL at 16:40

## 2023-05-31 ASSESSMENT — ENCOUNTER SYMPTOMS
DIARRHEA: 0
VOMITING: 0
NAUSEA: 0
SORE THROAT: 0
EYE REDNESS: 0
ABDOMINAL PAIN: 0
SHORTNESS OF BREATH: 0
COUGH: 0

## 2023-05-31 ASSESSMENT — LIFESTYLE VARIABLES
HOW MANY STANDARD DRINKS CONTAINING ALCOHOL DO YOU HAVE ON A TYPICAL DAY: PATIENT DOES NOT DRINK
HOW OFTEN DO YOU HAVE A DRINK CONTAINING ALCOHOL: NEVER

## 2023-05-31 ASSESSMENT — PAIN SCALES - GENERAL
PAINLEVEL_OUTOF10: 0
PAINLEVEL_OUTOF10: 0

## 2023-05-31 ASSESSMENT — SLEEP AND FATIGUE QUESTIONNAIRES
AVERAGE NUMBER OF SLEEP HOURS: 5
DO YOU HAVE DIFFICULTY SLEEPING: YES
DO YOU USE A SLEEP AID: YES
SLEEP PATTERN: DIFFICULTY FALLING ASLEEP;INSOMNIA

## 2023-05-31 ASSESSMENT — PAIN - FUNCTIONAL ASSESSMENT: PAIN_FUNCTIONAL_ASSESSMENT: 0-10

## 2023-05-31 NOTE — ED NOTES
Security transporting pt to [place] at this time with belongings.      Carmen Lopez RN  98/48/83 1526

## 2023-05-31 NOTE — ED NOTES
Salima Byrd RN, poison control called for an update. Poison control stated to continue with the 6 hour observation as discussed earlier.      Daija Samuels RN  05/31/23 8658

## 2023-05-31 NOTE — ED NOTES
Pt offered breakfast, politely declined, declined warm blankets, requested lights be dimmed slightly, pt still observable, trying to rest. Tahir Gibbs RN  95/34/24 8916

## 2023-05-31 NOTE — ED NOTES
This writer called and spoke to [facility] at [facility] and updated her pt is medically clear.   Plan:  pt to be admitted to Dane Horner RN  05/31/23 2304

## 2023-05-31 NOTE — ED NOTES
TRANSFER - IN REPORT:    Verbal report received from Cris Tamez  being received fromER or routine progression of patient care      Report consisted of patient's Situation, Background, Assessment and   Recommendations(SBAR). Information from the following report(s) Nurse Handoff Report, ED Encounter Summary, ED SBAR, Intake/Output, MAR, Recent Results, Med Rec Status, and Cardiac Rhythm sinus  was reviewed with the receiving nurse. Opportunity for questions and clarification was provided. Assessment completed upon patient's arrival to unit and care assumed.        Abbi Chambers RN  05/31/23 0636

## 2023-05-31 NOTE — ED NOTES
Joby Perrin RN called from poison control and updated that patient has remained stable and will be admitted     Tiburcioephraim Saeed, MARIA DEL ROSARIO  05/31/23 4092

## 2023-05-31 NOTE — ED NOTES
This writer spoke to  tova at [facility] and gave appropriate behavior      Lara Fletcher RN  05/31/23 9757

## 2023-05-31 NOTE — BH NOTE
Admission Note:     Patient arrived on the unit @ [phone] from hospitals ED on Voluntary admission. Patient was escorted on the unit  by [facility] via wheelchair. Dr. Divina Brown and Nursing Supervisor were notified of patient's arrival. Consult placed for Medical H&P done by Hospitalist,Dr. Justin Hendrickson. Patient Alert and oriented x 4. Patient depressed and tearful. Denies SI/HI/AVH. No delusional statements made. Patient denied having a license with [employer]. Patient denies tobacco or drug use. Patient drinks ETOH occasionally. Patient in no apparent distress all vital signs within normal limits.

## 2023-05-31 NOTE — ED NOTES
Agueda from [facility] called and reported pt meets criteria for admission.   Pt is voluntary at this time but if pt attempts to leave to notify [employer] so that Gregg Dodd can be notified      Kin Lombard, RN  05/31/23 6455

## 2023-05-31 NOTE — BSMART NOTE
BSMART assessment completed, and suicide risk level noted to be HIGH Primary Nurse Oswaldo Quiroz and Charge Nurse AZEEM and Physician Dr. Frantz Bertrand notified. Concerns not observed.     Security/Off- has not been notified.'
[facility] to bedside via [facility].
Pt accepted by Dr. Estefania Chavez to [id number]. Nursing report [phone]. Clarice/nurse update don admission.
suicide attempts until last night. Pt stated she has had enough and so she wrote a goodbye letter to her children. Pt stated she felt much better after writing the goodbye note bc she is just feeling so helpless sand hopeless. This morning pt took x4 Seroquel in attempt to die by suicide. Pt denied HI and hallucinations. Pt shared her depression goes along with thoughts/anxiety in her head of low self esteem and not feeling worthy. Pt shared she has had decreased appetite the last few weeks. Pt's sleep has been poor during this time as well and she reported going to sleep at 9pm and waking at 11pm and then trying to get back asleep later during the morning hours. Pt resides alone and is . Pt has x3 adult children and she felt their relationships were intact and \"okay. 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 Pt denied any hx of trauma. Pt reported she wears reading glasses and has hard time of hearing. Recommendation is psychiatric admission for mood stabilization and safety. However, pt did not want to be admitted and asked could she just go home [facility] \"I just took x4 Seroquel? \"  Writer explained TDO process thus pt now willing to be admitted on voluntary status. Clarice/nurse updated should pt decide to leave MIGNON/writer should be called for crisis assessment. Dr. Luciano Dandy in agreement with disposition. The patient has demonstrated mental capacity to provide informed consent. The information is given by the patient. The Chief Complaint is intentional overdose-depression. The Precipitant Factors are increased depression and self thoughts/anxiety. Previous Hospitalizations: yes at age 29  The patient has not previously been in restraints. Current Psychiatrist and/or  is no one. Lethality Assessment:    The potential for suicide noted by the following: intent, defined plan, current attempt, ideation, and means . The potential for homicide is not noted.   The patient

## 2023-05-31 NOTE — ED NOTES
Pt son at bedside with pt, holding hand and offering support.  Pt daughter also with pt, both calm, supportive to pt in room       Katya Baca RN  09/12/06 1300

## 2023-05-31 NOTE — ED NOTES
This writer spoke to GRACIE at [address] [place] [address],[address] control who advised observation for 6-8 hours from time of ingestion along with psych labs and ekg.        Ashley Richards RN  05/31/23 3873

## 2023-05-31 NOTE — ED TRIAGE NOTES
Pt arrived by [facility] after intentional overdose. Per EMS pt reported she took 4 Seroquel 50 mg at 0730. Pt reports she has depression and is tired of dealing with it and that she is not getting any better. Pt reports she wrote a note to her children last night.   Pt arrived awake alert and oriented X 4, pt educated on Erf low

## 2023-05-31 NOTE — ED NOTES
Pt speaking with registration at this time, pt is awake alert and oriented X [id number] Thor Thorpe RN  05/31/23 0270

## 2023-05-31 NOTE — BH NOTE
TRANSFER - IN REPORT:    Verbal report received from [employer] on Cece Best  being received from [facility] ED (unit) for routine progression of patient care      Report consisted of patients Situation, Background, Assessment and   Recommendations(SBAR). Information from the following report(s) ED Encounter Summary, ED SBAR, MAR, Recent Results, and Med Rec Status was reviewed with the receiving nurse. Opportunity for questions and clarification was provided. Assessment completed upon patients arrival to unit and care assume      Lines:  Patient arrived on the unit at 1530. Patient escorted on the unit by [facility].

## 2023-05-31 NOTE — BH NOTE
[address] [facility] 60  Master Treatment Plan for Margarita Mason Treatment Plan Initiated: 05/31/2023    Treatment Plan Modalities:  Type of Modality Amount  (x minutes) Frequency (x/week) Duration (x days) Name of Responsible Staff   Community & wrap-up meetings to encourage peer interactions 30 14 1 SHERYL Valdivia [address] [place] [address] psychotherapy to assist in building coping skills and internal controls [facility]  David Grande., [facility]   Therapeutic activity groups to build coping skills 60 7 1 Salima Haji., [facility]     Psychoeducation in group setting to address:   Medication education  Coping Skills  Symptom Management   60 7 1 Earnesteen Ovens., [facility]  David Grande., [facility]  Hope Saint., RN  Mary Ellen Barnes RN   Discharge planning   60 2 1 Russell Wiley., [facility]   Spirituality    60 2 1 Carissa Jaffe   Physician medication management   15 7 1 ISIS Gutierrez MD                                         Treatment Team Signatures    I have participated in the development of this plan of treatment and agree to its implementation.     Patient Signature       Patient Printed Name Date/Time   Social Work/Therapist Signature       Social Work/Therapist Printed Name Date/Time   RN Signature       RN Printed Name    Candie Son RN Date/Time   Other Signature     Other Signature Date/Time   MD Signature       MD Printed Name Date/Time

## 2023-06-01 LAB
CHOLEST SERPL-MCNC: 236 MG/DL
EST. AVERAGE GLUCOSE BLD GHB EST-MCNC: ABNORMAL MG/DL
HBA1C MFR BLD: <5.7 % (ref 4–5.6)
HDLC SERPL-MCNC: 55 MG/DL
HDLC SERPL: 4.3 (ref 0–5)
LDLC SERPL CALC-MCNC: 160.2 MG/DL (ref 0–100)
TRIGL SERPL-MCNC: 104 MG/DL
VLDLC SERPL CALC-MCNC: 20.8 MG/DL

## 2023-06-01 PROCEDURE — 6370000000 HC RX 637 (ALT 250 FOR IP): Performed by: INTERNAL MEDICINE

## 2023-06-01 PROCEDURE — 90792 PSYCH DIAG EVAL W/MED SRVCS: CPT | Performed by: PSYCHIATRY & NEUROLOGY

## 2023-06-01 PROCEDURE — 6370000000 HC RX 637 (ALT 250 FOR IP): Performed by: PSYCHIATRY & NEUROLOGY

## 2023-06-01 PROCEDURE — 83036 HEMOGLOBIN GLYCOSYLATED A1C: CPT

## 2023-06-01 PROCEDURE — 80061 LIPID PANEL: CPT

## 2023-06-01 PROCEDURE — 1240000000 HC EMOTIONAL WELLNESS R&B

## 2023-06-01 PROCEDURE — 36415 COLL VENOUS BLD VENIPUNCTURE: CPT

## 2023-06-01 RX ORDER — ARIPIPRAZOLE 2 MG/1
2 TABLET ORAL DAILY
Status: DISCONTINUED | OUTPATIENT
Start: 2023-06-01 | End: 2023-06-03 | Stop reason: HOSPADM

## 2023-06-01 RX ORDER — TRAZODONE HYDROCHLORIDE 100 MG/1
100 TABLET ORAL NIGHTLY PRN
Status: DISCONTINUED | OUTPATIENT
Start: 2023-06-01 | End: 2023-06-03 | Stop reason: HOSPADM

## 2023-06-01 RX ADMIN — LISINOPRIL 10 MG: 10 TABLET ORAL at 09:08

## 2023-06-01 RX ADMIN — HYDROXYZINE PAMOATE 25 MG: 25 CAPSULE ORAL at 13:17

## 2023-06-01 RX ADMIN — ARIPIPRAZOLE 2 MG: 2 TABLET ORAL at 09:08

## 2023-06-01 RX ADMIN — HYDROCHLOROTHIAZIDE 12.5 MG: 12.5 CAPSULE ORAL at 09:10

## 2023-06-01 RX ADMIN — VENLAFAXINE HYDROCHLORIDE 150 MG: 150 CAPSULE, EXTENDED RELEASE ORAL at 09:08

## 2023-06-01 RX ADMIN — TRAZODONE HYDROCHLORIDE 100 MG: 100 TABLET ORAL at 20:45

## 2023-06-01 RX ADMIN — Medication 400 MG: at 09:08

## 2023-06-01 ASSESSMENT — PAIN SCALES - GENERAL: PAINLEVEL_OUTOF10: 0

## 2023-06-01 NOTE — CONSULTS
social and family histories were reviewed. I have discussed management plan with patient/family and with nursing staff. Toxic drug monitoring:  _______________________________________________________________________  Care Plan discussed with:    Comments   Patient x    Family      RN x    Care Manager                    Consultant:      _______________________________________________________________________  Expected  Disposition:   Home with Family x   HH/PT/OT/RN    SNF/LTC    PAUL    ________________________________________________________________________  TOTAL TIME:  25 Minutes    Critical Care Provided     Minutes non procedure based      Comments    x Reviewed previous records   >50% of visit spent in counseling and coordination of care x Discussion with patient and/or family and questions answered       ________________________________________________________________________  Signed: Tomas Coello MD    Procedures: see electronic medical records for all procedures/Xrays and details which were not copied into this note but were reviewed prior to creation of Plan.

## 2023-06-01 NOTE — BH NOTE
PSYCHOSOCIAL ASSESSMENT  :Patient identifying info: Shahnaz Akins is a 79 y.o., female admitted 5/31/2023  9:03 AM     Presenting problem and precipitating factors: Patient was admitted voluntarily through the [facility] Room due to escalating symptoms of that depression particularly over the past several weeks. Pt  has a past psychiatric history of major depression. She had a recent break up with her boyfriend and feels this may have been a trigger for her taking a small dose of medication  Mental status assessment: Pt states she has a long history of low self esteem . Pt states she does not have issues with alcohol or drugs. She has no history of exhibiting signs of pamela, hypomania or psychosis    Strengths/Recreation/Coping Skills:Pt has stable housing and is able to care for herself    Collateral information: patient    Current psychiatric /substance abuse providers and contact info: none ({PCP)    Previous psychiatric/substance abuse providers and response to treatment: none      Family history of mental illness or substance abuse:  Aunt has schizophrenia and mother has issues with depression and alcohol     Substance abuse history:    Social History     Tobacco Use    Smoking status: Never    Smokeless tobacco: Never   Substance Use Topics    Alcohol use: No       History of biomedical complications associated with substance abuse: na    Patient's current acceptance of treatment or motivation for change: pt is accepting of treatment    Family constellation: Pt has 3 children and is     Is significant other involved? no    Describe support system: children    Describe living arrangements and home environment: lives alone    GUARDIAN/POA: no  Guardian Name:     Guardian Contact:     Health issues: Hypertension, Hearing issues    Trauma history: denies  Legal issues: denies    History of  service: no    Financial status: SS    Denominational/cultural factors: none expressed    Education/work

## 2023-06-01 NOTE — BH NOTE
Affect constricted, mood depressed/anxious. Alert and Oriented X 4. Cooperative and pleasant. Refused group and eat bedtime snack. Pt isolative and withdrawn. Makes needs known. Denied SI/HI/AVH and pain. Pt did not have scheduled meds during shift. Stable with no s/s of distress.  Laid in bed with eyes for 5 hours and 30 min     PRN Medication Documentation     Specific patient behavior that led to the need for PRN medication: sleep  Staff interventions attempted prior to PRN being given: deep breathing   PRN medication given: trazodone 50 mg  po at 2111   Patient responsiveness/effectiveness of PRN medication: tolerated

## 2023-06-01 NOTE — GROUP NOTE
Group Therapy Note    Date: 6/1/2023    Group Start Time: 0900  Group End Time: 2007  Group Topic: Process Group - Inpatient    [facility]    Carman Dubin, LCSW        Group Therapy Note  Focus of session was on a strategy to help manage our communication and what we say when we are in emotional turmoil. We spoke about sometimes to avoid reacting and speaking before we think, we need to consider keeping our mouth shut when we are in the heat of anger, when you don't have all the facts, when you haven't verified the story, if the words will end up being a poor reflection of you, when you will be ashamed later, when you are feeling critical, when it is time to listen, and if you may have to eat your words later. We spoke about strategies to help with stop and think before we speak to help eliminate unnecessary further problems to resolve. Patient's Goal:      Reduce evidence of anxiety/worry/anger                Notes:  Pt participated in the group activity and engaged with other members. She shared she had a difficult day yesterday but was feeling better today. She knew she needed to get her medication managed and is willing to stay long enough to feel comfortable  before going  home. Pt was attentive and cooperative     Status After Intervention:  Improved    Participation Level:  Active Listener and Interactive    Participation Quality: Appropriate      Speech:  normal      Thought Process/Content: Logical      Affective Functioning: Congruent      Mood: anxious      Level of consciousness:  Alert and Oriented x4      Response to Learning: Able to verbalize current knowledge/experience, Able to verbalize/acknowledge new learning, Capable of insight, and Progressing to goal      Endings: None Reported    Modes of Intervention: Education, Support, Socialization, Exploration, Clarifying, Problem-solving, and

## 2023-06-01 NOTE — H&P
[facility] HISTORY AND PHYSICAL    Name:  Luis M Lamar  MR#:  [id number]  :  1955  ACCOUNT #:  [de-identified]  ADMIT DATE:  2023    [facility]    HISTORY OF PRESENT ILLNESS:  The patient is a 49-year-old  white female, who has a past psychiatric history of major depression, and he was admitted voluntarily through the hospitals Emergency Room due to escalating symptoms of that illness, particularly over the past several weeks. She had actually taken a very small overdose of approximately 450 mg Seroquel tablets in a suicide gesture, and she had apparently wrote a note to her children as well. She states that she knew the dose of Seroquel was not lethal, but that she essentially felt desperate to try to get some relief from her dysphoria, and was hoping that she might sleep. It should be noted that she was seen for a recent PCP appointment on 2023, at which time, she was reportedly doing reasonably well, but she states that she has dealt with a longstanding history of chronic depression, with even some slight residual dysphoria even when she has been functioning adequately. Her neurovegetative functioning has worsened, particularly over the last several weeks, to where she is not sleeping well at all. Her appetite is very poor and she thinks she has lost a little bit of weight. Her energy level is low and she has been very anhedonic and dysphoric. She feels useless and worthless, which are chronic issues for her, but also she feels quite hopeless. This has led to some suicidal thoughts. She states she has no intention of ever acting on those thoughts and has never made any attempts to try to harm herself in the past.    She states the trigger for this may have been the fact that her boyfriend of 7 years essentially broke off the relationship recently.   She states, however, this relationship had been falling apart for some time, but

## 2023-06-01 NOTE — BH NOTE
Patient  affect constricted and mood depressed. Patient alert and oriented x 4. Denies pain. No SI/HI/AVH. No delusional or paranoid statements made. Patient cooperative and pleasant. Medication compliant and no PRN's given. Patient appetite good eats 100% of all meals plus snacks. Patient attended and engaged in group with prompting. Patient in no apparent distress. Vitals signs within normal limits. No fall noted on this shift.

## 2023-06-01 NOTE — BH NOTE
This writer met with patient today she was admitted voluntarily. She was having increased depression with suicidal thoughts. She stated she was so hopeless and so tired of feeling the way she has been feeling. Today she reports she is feeling better and more hopeful that she has had her medication adjusted. She has enjoyed being social and feels she has a new outlook. She is future focused and realizes she can no longer continue to feel sorry for self. She is employed, lives alone and is able to care for herself. She is interested in attending [facility]. She was very pleasant and cooperative. She plans on returning back home and will either follow up with [facility] outpatient or her PCP.

## 2023-06-02 PROCEDURE — 6370000000 HC RX 637 (ALT 250 FOR IP): Performed by: PSYCHIATRY & NEUROLOGY

## 2023-06-02 PROCEDURE — 99232 SBSQ HOSP IP/OBS MODERATE 35: CPT | Performed by: PSYCHIATRY & NEUROLOGY

## 2023-06-02 PROCEDURE — 1240000000 HC EMOTIONAL WELLNESS R&B

## 2023-06-02 PROCEDURE — 6370000000 HC RX 637 (ALT 250 FOR IP): Performed by: INTERNAL MEDICINE

## 2023-06-02 RX ORDER — TRAZODONE HYDROCHLORIDE 100 MG/1
100 TABLET ORAL NIGHTLY PRN
Qty: 30 TABLET | Refills: 2 | Status: SHIPPED | OUTPATIENT
Start: 2023-06-02 | End: 2023-06-08 | Stop reason: HOSPADM

## 2023-06-02 RX ORDER — ARIPIPRAZOLE 2 MG/1
2 TABLET ORAL DAILY
Qty: 30 TABLET | Refills: 2 | Status: SHIPPED | OUTPATIENT
Start: 2023-06-03

## 2023-06-02 RX ORDER — HYDROXYZINE PAMOATE 25 MG/1
25 CAPSULE ORAL 2 TIMES DAILY PRN
Qty: 30 CAPSULE | Refills: 2 | Status: SHIPPED | OUTPATIENT
Start: 2023-06-02

## 2023-06-02 RX ADMIN — VENLAFAXINE HYDROCHLORIDE 150 MG: 150 CAPSULE, EXTENDED RELEASE ORAL at 08:41

## 2023-06-02 RX ADMIN — TRAZODONE HYDROCHLORIDE 100 MG: 100 TABLET ORAL at 21:14

## 2023-06-02 RX ADMIN — LISINOPRIL 10 MG: 10 TABLET ORAL at 08:42

## 2023-06-02 RX ADMIN — Medication 400 MG: at 08:42

## 2023-06-02 RX ADMIN — ARIPIPRAZOLE 2 MG: 2 TABLET ORAL at 08:42

## 2023-06-02 RX ADMIN — HYDROCHLOROTHIAZIDE 12.5 MG: 12.5 CAPSULE ORAL at 08:41

## 2023-06-02 ASSESSMENT — PAIN SCALES - GENERAL
PAINLEVEL_OUTOF10: 0

## 2023-06-02 NOTE — BH NOTE
Patient will be discharged to home on Saturday Maggie 3, 2023. She is agreeable to follow up with the [facility] program two times per week. She will attend on Wednesday and Thursday. Patient prefers to drive to program. She was involved in her discharge plan and agreeable. She was instructed to call her PCP with follow up as needed. The [address] [place] [address] transition of care will be routed to Dr. Chilo Aly and to her PCP.

## 2023-06-02 NOTE — BH NOTE
Behavioral Health Transition Record to Provider    Patient Name: Tori Jang  YOB: 1955  Medical Record Number: [id number]  Date of Admission: 5/31/2023  Date of Discharge: 06/03/2023    Attending Provider: Rajani Ureña MD  Discharging Provider: To contact this individual Domenica Cruz call [phone] and ask the  to page. If unavailable, ask to be transferred to [address] [place] [address] Provider on call. [facility] Provider will be available on call 24/7 and during holidays. Primary Care Provider: Edwardo Simmons MD    Allergies   Allergen Reactions    Hydrocodone Itching and Rash    Morphine Rash       Reason for Admission: The patient is a 42-year-old  white female, who has a past psychiatric history of major depression, and she was admitted voluntarily through the \[facility]\"" Emergency Room due to escalating symptoms of that illness, particularly over the past several weeks. She had actually taken a very small overdose of approximately 450 mg Seroquel tablets in a suicide gesture, and she had apparently wrote a note to her children as well. She states that she knew the dose of Seroquel was not lethal, but that she essentially felt desperate to try to get some relief from her dysphoria, and was hoping that she might sleep. It should be noted that she was seen for a recent PCP appointment on 04/03/2023, at which time, she was reportedly doing reasonably well, but she states that she has dealt with a longstanding history of chronic depression, with even some slight residual dysphoria even when she has been functioning adequately. Her neurovegetative functioning has worsened, particularly over the last several weeks, to where she is not sleeping well at all. Her appetite is very poor and she thinks she has lost a little bit of weight. Her energy level is low and she has been very anhedonic and dysphoric.   She feels useless and worthless, which are chronic issues for

## 2023-06-02 NOTE — BH NOTE
Affect blunted. Mood anxious/depressed. Cooperative and med compliant. Ate 2/3 of meals. Attended group as active participant. Quiet and constricted. Able to verbalize what has been going on lately. Speech soft; logical and linear. Denies SI/HI/AVH. Pale. Alert and oriented x 4. Happy about going home tomorrow.

## 2023-06-02 NOTE — BH NOTE
Patient mood is flat, she is calm and cooperative with staff interventions. She denies pain, AVH. She reports \"feeling better, I was able to get something for my anxiety and it helped\". She spends most of time this tour in her bedroom talking with her peer. She consumes snack, and fluids. She takes medications as prescribed with no concerns. No behavior concerns at this time.      PRN Medication Documentation    Specific patient behavior that led to need for PRN medication: feeling restless  Staff interventions attempted prior to PRN being given: request medication  PRN medication given: Trazodone 100 by mouth @2045  Patient response/effectiveness of PRN medication: effective       Slept 7.45 hours this tour

## 2023-06-02 NOTE — BH NOTE
Behavioral Health Interdisciplinary Rounds     Patient Name: Nola Frankel  Age: 79 y.o. Room/Bed:  232/02  Primary Diagnosis: Severe episode of recurrent major depressive disorder, without psychotic features ([facility]   Admission Status: Voluntary     Readmission within 30 days: No  Power of  in place: Yes  Patient requires a blocked bed: No14}          Reason for blocked bed:     Sleep hours: 7.45       Participation in Care/Groups:  Yes  Medication Compliant?: Yes  PRNS (last 24 hours): Antianxiety and Sleep Aid    Restraints (last 24 hours):  No  Substance Abuse:  No    24 hour chart check complete: Yes    Patient goal(s) for today: to be discharged and have a new outlook  Treatment team focus/goals:  Verbalization of thoughts and feelings  Progress note: patient feels she has had improvement, is very hopeful and is agreeable to start IOP for two days a week due to patient being employed. She is insightful knowing this will help her in the future    LOS:  2  Expected LOS: 1    Financial concerns/prescription coverage:  No  Family contact: no      Family requesting physician contact today:  No  Discharge plan: return home  Access to weapons: No       Outpatient provider(s): PCP and will start [facility]  Patient's preferred phone number for follow up call: [phone]    Participating treatment team members:  Dr. Mejia Sanders(assigned SW), María Crandall

## 2023-06-02 NOTE — GROUP NOTE
Group Therapy Note    Date: 6/2/2023    Group Start Time: 1645  Group End Time: 3150  Group Topic: Nursing    [facility] , RN        Group Therapy Note    Attendees: 3       Patient's Goal:  to learn new coping skills    Status After Intervention:  Improved    Participation Level:  Active Listener    Participation Quality: Appropriate and Attentive      Speech:  normal      Thought Process/Content: Logical  Linear      Affective Functioning: Congruent and Blunted      Mood: depressed      Level of consciousness:  Alert and Oriented x4      Response to Learning: Able to verbalize current knowledge/experience and Capable of insight      Endings: None Reported    Modes of Intervention: Support      Discipline Responsible: Registered Nurse      Signature:  Mo Wilson RN

## 2023-06-02 NOTE — DISCHARGE SUMMARY
[facility] DISCHARGE    Name:  Lorraine Trejo  MR#:  [id number]  :  1955  ACCOUNT #:  [de-identified]  ADMIT DATE:  2023  DISCHARGE DATE:  2023    [facility]    NOTE:  Please see the admission note for further admission information. DISCHARGE DIAGNOSES:  AXIS I:  Major depression, recurrent, severe (F33.2). AXIS II:  Deferred. AXIS III:  Hypertension; hyperlipidemia; hard of hearing. AXIS IV:  Stressors are moderate to severe (breakup of relationship, limited support system). AXIS V:  Global Assessment of Functioning at discharge is 75. DISCHARGE MEDICATIONS:  1. Abilify 2 mg daily (new) -- #30 pills with two refills. 2.  Effexor  mg daily. 3.  Trazodone 100 mg at bedtime p.r.n. for sleep (new) -- #30 pills with two refills. 4.  Vistaril 25 mg b.i.d. p.r.n. anxiety (new) -- #30 pills with two refills. 5.  Propranolol 40 mg b.i.d. p.r.n. for headaches and palpitations. 6.  Lisinopril/HCTZ 10/12.5 mg daily. DISPOSITION/FOLLOWUP PLANS:  The patient is being discharged in stable condition on 2023, to return home where she lives alone. She will be starting in our [facility] next week, likely on 2023. I will be following her in that setting, psychiatrically. HOSPITAL COURSE:  As noted in the admission note, the patient is a 49-year-old  white female with a past psychiatric history of Major Depression, was admitted voluntarily through the [facility] Emergency Room due to escalating symptoms of that illness.   This had been building for several weeks, and she had actually taken a very small overdose of only four of the 50 mg Seroquel tablets in a suicide gesture, but she states that she really was not trying to end her life and she knew that it would not be fatal.  She had, however, written essentially a good-bye type note and some of her thoughts as if she was addressing her children, but she indicates that, that was

## 2023-06-02 NOTE — PROGRESS NOTES
INTERVAL Hx:  Records and clinical information were reviewed. States she's feeling much better and that being here has really helped her, affectively. Realizes she needs to socialize a bit more and stay more active as well once she's D/C'ed. Wants to be D/C'ed by tomorrow, and she doesn't meet criteria for a TDO. Tolerating the addition of Abilify without any SE's noted, and we discussed what to expect going forward. States she's agreeable to start in the [facility] program post-D/C, which I strongly encouraged. Slept 7.75 hours last night. MEDS:  Current Facility-Administered Medications   Medication Dose Route Frequency    ARIPiprazole (ABILIFY) tablet 2 mg  2 mg Oral Daily    traZODone (DESYREL) tablet 100 mg  100 mg Oral Nightly PRN    venlafaxine (EFFEXOR XR) extended release capsule 150 mg  150 mg Oral Daily with breakfast    hydrOXYzine pamoate (VISTARIL) capsule 25 mg  25 mg Oral TID PRN    acetaminophen (TYLENOL) tablet 650 mg  650 mg Oral Q4H PRN    magnesium hydroxide (MILK OF MAGNESIA) 400 MG/5ML suspension 30 mL  30 mL Oral Daily PRN    aluminum & magnesium hydroxide-simethicone (MAALOX) 200-200-20 MG/5ML suspension 30 mL  30 mL Oral Q6H PRN    lisinopril (PRINIVIL;ZESTRIL) tablet 10 mg  10 mg Oral Daily    hydroCHLOROthiazide (MICROZIDE) capsule 12.5 mg  12.5 mg Oral Daily    magnesium oxide (MAG-OX) tablet 400 mg  400 mg Oral Daily       VITALS: Patient Vitals for the past 12 hrs:   Temp Pulse Resp BP SpO2   06/02/23 0841 -- -- -- 121/62 --   06/02/23 0840 98.7 °F (37.1 °C) (!) 101 18 (!) 100/52 95 %   06/02/23 0757 98.7 °F (37.1 °C) 82 17 121/62 --       LABS: .No results found for this or any previous visit (from the past 24 hour(s)).     MSE:   Appearance: casually dressed; adequately groomed  Behavior: calm and cooperative; no agitation  Motor: normal  Mood/Affect: full and euthymic  Thought Process: coherent; organized  Thought Content: no delusions; no SI/HI  Perceptions: no

## 2023-06-02 NOTE — GROUP NOTE
Group Therapy Note    Date: 6/2/2023    Group Start Time: 0900  Group End Time: 8825  Group Topic: Process Group - Inpatient    [address] OP    Ab Evans LCSW        Group Therapy Note    Attendees: Focus of session was based on the 4 agreements by Elsie Ruiz. The 4 agreements are: be impeccable with your word, don't take anything personally, don't make assumptions, and always do your best.  We spoke about these ideas and how they can impact our mental health recovery in a positive way and help us take steps forward. I asked group members to share which one they will focus on and how they think it can and will impact their daily life. Patient's Goal:      Verbalization of thoughts and feelings                Notes:  Pt participated in the group activity and discussion. She shared that she is still having a hard time with her recent breakup. She continues to question her self esteem and wants to try and do things to get out and build her confidence. The group discussed strategies for socialization and building self confidence    Status After Intervention:  Improved    Participation Level:  Active Listener and Interactive    Participation Quality: Appropriate, Attentive, Sharing, and Supportive      Speech:  normal      Thought Process/Content: Logical      Affective Functioning: Congruent      Mood: brighter      Level of consciousness:  Alert and Oriented x4      Response to Learning: Able to verbalize current knowledge/experience, Able to verbalize/acknowledge new learning, Able to retain information, Capable of insight, Able to change behavior, and Progressing to goal      Endings: None Reported    Modes of Intervention: Education, Support, Socialization, Exploration, Clarifying, Problem-solving, and Activity      Discipline Responsible: /Counselor      Signature:  Ab Evans LCSW

## 2023-06-03 VITALS
HEIGHT: 64 IN | OXYGEN SATURATION: 96 % | DIASTOLIC BLOOD PRESSURE: 65 MMHG | TEMPERATURE: 98.6 F | SYSTOLIC BLOOD PRESSURE: 103 MMHG | HEART RATE: 80 BPM | WEIGHT: 194 LBS | RESPIRATION RATE: 17 BRPM | BODY MASS INDEX: 33.12 KG/M2

## 2023-06-03 PROCEDURE — 6370000000 HC RX 637 (ALT 250 FOR IP): Performed by: INTERNAL MEDICINE

## 2023-06-03 PROCEDURE — 6370000000 HC RX 637 (ALT 250 FOR IP): Performed by: PSYCHIATRY & NEUROLOGY

## 2023-06-03 PROCEDURE — 99238 HOSP IP/OBS DSCHRG MGMT 30/<: CPT | Performed by: PSYCHIATRY & NEUROLOGY

## 2023-06-03 RX ADMIN — Medication 400 MG: at 08:33

## 2023-06-03 RX ADMIN — ARIPIPRAZOLE 2 MG: 2 TABLET ORAL at 08:33

## 2023-06-03 RX ADMIN — HYDROCHLOROTHIAZIDE 12.5 MG: 12.5 CAPSULE ORAL at 08:33

## 2023-06-03 RX ADMIN — VENLAFAXINE HYDROCHLORIDE 150 MG: 150 CAPSULE, EXTENDED RELEASE ORAL at 08:33

## 2023-06-03 RX ADMIN — LISINOPRIL 10 MG: 10 TABLET ORAL at 08:33

## 2023-06-03 ASSESSMENT — PAIN SCALES - GENERAL: PAINLEVEL_OUTOF10: 0

## 2023-06-03 NOTE — PLAN OF CARE
Problem: Depression  Goal: Will be euthymic at discharge  Description: INTERVENTIONS:  1. Administer medication as ordered  2. Provide emotional support via 1:1 interaction with staff  3. Encourage involvement in milieu/groups/activities  4. Monitor for social isolation  5/31/2023 1800 by Leela Gil RN  Outcome: Not Progressing     Problem: Anxiety  Goal: Will report anxiety at manageable levels  Description: INTERVENTIONS:  1. Administer medication as ordered  2. Teach and rehearse alternative coping skills  3. Provide emotional support with 1:1 interaction with staff  5/31/2023 1800 by Leela Gil RN  Outcome: Not Progressing     Problem: Safety - Adult  Goal: Free from fall injury  Outcome: Progressing     Problem: Discharge Planning  Goal: Discharge to home or other facility with appropriate resources  Recent Flowsheet Documentation  Taken 5/31/2023 1530 by Leela Gil RN  Discharge to home or other facility with appropriate resources: Identify barriers to discharge with patient and caregiver     Problem: Depression  Goal: Will be euthymic at discharge  Description: INTERVENTIONS:  1. Administer medication as ordered  2. Provide emotional support via 1:1 interaction with staff  3. Encourage involvement in milieu/groups/activities  4. Monitor for social isolation  5/31/2023 1800 by Leela Gil RN  Outcome: Not Progressing     Problem: Anxiety  Goal: Will report anxiety at manageable levels  Description: INTERVENTIONS:  1. Administer medication as ordered  2. Teach and rehearse alternative coping skills  3.  Provide emotional support with 1:1 interaction with staff  5/31/2023 1800 by Leela Gil RN  Outcome: Not Progressing
Problem: Depression  Goal: Will be euthymic at discharge  Description: INTERVENTIONS:  1. Administer medication as ordered  2. Provide emotional support via 1:1 interaction with staff  3. Encourage involvement in milieu/groups/activities  4. Monitor for social isolation  Outcome: Not Progressing     Problem: Anxiety  Goal: Will report anxiety at manageable levels  Description: INTERVENTIONS:  1. Administer medication as ordered  2. Teach and rehearse alternative coping skills  3. Provide emotional support with 1:1 interaction with staff  Outcome: Not Progressing     Problem: Depression  Goal: Will be euthymic at discharge  Description: INTERVENTIONS:  1. Administer medication as ordered  2. Provide emotional support via 1:1 interaction with staff  3. Encourage involvement in milieu/groups/activities  4. Monitor for social isolation  Outcome: Not Progressing     Problem: Anxiety  Goal: Will report anxiety at manageable levels  Description: INTERVENTIONS:  1. Administer medication as ordered  2. Teach and rehearse alternative coping skills  3.  Provide emotional support with 1:1 interaction with staff  Outcome: Not Progressing
Problem: Discharge Planning  Goal: Discharge to home or other facility with appropriate resources  Outcome: Adequate for Discharge  Flowsheets (Taken 6/3/2023 0805)  Discharge to home or other facility with appropriate resources: Identify barriers to discharge with patient and caregiver     Problem: ABCDS Injury Assessment  Goal: Absence of physical injury  Outcome: Adequate for Discharge  Flowsheets (Taken 6/3/2023 0805)  Absence of Physical Injury: Implement safety measures based on patient assessment     Problem: Pain  Goal: Verbalizes/displays adequate comfort level or baseline comfort level  Outcome: Adequate for Discharge  Flowsheets (Taken 6/3/2023 0805)  Verbalizes/displays adequate comfort level or baseline comfort level: Encourage patient to monitor pain and request assistance     Problem: Safety - Adult  Goal: Free from fall injury  Outcome: Adequate for Discharge  Flowsheets (Taken 6/3/2023 0805)  Free From Fall Injury: Instruct family/caregiver on patient safety     Problem: Spiritual Struggle  Goal: Verbalizes spiritual struggle  Outcome: Adequate for Discharge  Goal: Gains new understanding/perception  Outcome: Adequate for Discharge  Goal: Growing sense of peace/hope  Outcome: Adequate for Discharge  Goal: Explore resources for additional follow up, post discharge  Outcome: Adequate for Discharge     Problem: Emotional Distress  Goal: Verbalization of thoughts and feelings  Outcome: Adequate for Discharge  Goal: Reduce evidence of anxiety/worry/anger  6/3/2023 1021 by Nisha Norwood RN  Outcome: Adequate for Discharge  6/3/2023 0333 by Alonso Torre RN  Outcome: Progressing  Goal: Identifies/restores coping resources/skills  6/3/2023 1021 by Nisha Norwood RN  Outcome: Adequate for Discharge  6/3/2023 0333 by Alonso Torre RN  Outcome: Progressing  Goal: Growing sense of peace/hope  6/3/2023 1021 by Nisha Norwood RN  Outcome: Adequate for Discharge  6/3/2023 0333 by Alonso Torre
Problem: Pain  Goal: Verbalizes/displays adequate comfort level or baseline comfort level  Outcome: Progressing     Problem: Safety - Adult  Goal: Free from fall injury  6/1/2023 2149 by Leatha Sanders RN  Outcome: Progressing  6/1/2023 1903 by Brock Garner RN  Outcome: Progressing     Problem: Emotional Distress  Goal: Verbalization of thoughts and feelings  6/1/2023 2149 by Leatha Sanders RN  Outcome: Progressing  6/1/2023 1903 by Brock Garner RN  Outcome: Progressing  Goal: Reduce evidence of anxiety/worry/anger  6/1/2023 1903 by Brock Garner RN  Outcome: Progressing     Problem: Discharge Planning  Goal: Discharge to home or other facility with appropriate resources  Recent Flowsheet Documentation  Taken 6/1/2023 1310 by Brock Garner RN  Discharge to home or other facility with appropriate resources: Identify barriers to discharge with patient and caregiver     Problem: Anxiety  Goal: Will report anxiety at manageable levels  Description: INTERVENTIONS:  1. Administer medication as ordered  2. Teach and rehearse alternative coping skills  3.  Provide emotional support with 1:1 interaction with staff  Recent Flowsheet Documentation  Taken 6/1/2023 0751 by Brock Garner RN  Will report anxiety at manageable levels:   Administer medication as ordered   Teach and rehearse alternative coping skills
Problem: Safety - Adult  Goal: Free from fall injury  6/2/2023 1414 by Martínez Gomez RN  Outcome: Progressing  Flowsheets (Taken 6/2/2023 0802)  Free From Fall Injury: Instruct family/caregiver on patient safety     Problem: Pain  Goal: Verbalizes/displays adequate comfort level or baseline comfort level  6/2/2023 1414 by Martínez Gomez RN  Outcome: Progressing  Flowsheets (Taken 6/2/2023 1039)  Verbalizes/displays adequate comfort level or baseline comfort level: Encourage patient to monitor pain and request assistance     Problem: Discharge Planning  Goal: Discharge to home or other facility with appropriate resources  Recent Flowsheet Documentation  Taken 6/2/2023 2020 by Isabella Soliman RN  Discharge to home or other facility with appropriate resources: Identify barriers to discharge with patient and caregiver
Problem: Safety - Adult  Goal: Free from fall injury  Outcome: Progressing     Problem: Emotional Distress  Goal: Verbalization of thoughts and feelings  Outcome: Progressing  Goal: Reduce evidence of anxiety/worry/anger  Outcome: Progressing     Problem: Anxiety  Goal: Will report anxiety at manageable levels  Description: INTERVENTIONS:  1. Administer medication as ordered  2. Teach and rehearse alternative coping skills  3.  Provide emotional support with 1:1 interaction with staff  Recent Flowsheet Documentation  Taken 6/1/2023 0751 by Ness Villarreal RN  Will report anxiety at manageable levels:   Administer medication as ordered   Teach and rehearse alternative coping skills
Documentation  Taken 6/2/2023 0802 by Clyde Amanda RN  Discharge to home or other facility with appropriate resources: Identify barriers to discharge with patient and caregiver     Problem: Anxiety  Goal: Will report anxiety at manageable levels  Description: INTERVENTIONS:  1. Administer medication as ordered  2. Teach and rehearse alternative coping skills  3.  Provide emotional support with 1:1 interaction with staff  Recent Flowsheet Documentation  Taken 6/2/2023 0802 by Clyde Amanda RN  Will report anxiety at manageable levels:   Administer medication as ordered   Provide emotional support with 1:1 interaction with staff

## 2023-06-03 NOTE — BH NOTE
Affect blunted; mood depressed; smiliing. Interacts well with others. [place]. Ate 100% of meals. All belongings returned to patient. Verbalized understanding of DC instructions. Denies SI/HI/AVH/pain. Med compliant. DC with  son and dtr at 1.

## 2023-06-03 NOTE — GROUP NOTE
Group Therapy Note    Date: 6/3/2023    Group Start Time: 1400  Group End Time: 1430  Group Topic: Nursing    [facility] , RN        Group Therapy Note    Attendees: 2       Patient's Goal:  to go home and pay bills    Notes:  discussed what she will do first and how to use coping mechanisms to remain calm when family upsets her    Status After Intervention:  Improved    Participation Level:  Active Listener    Participation Quality: Appropriate      Speech:  pressured      Thought Process/Content: Logical  Linear      Affective Functioning: Congruent      Mood: depressed      Level of consciousness:  Alert and Oriented x4      Response to Learning: Capable of insight and Progressing to goal      Endings: None Reported    Modes of Intervention: Support      Discipline Responsible: Registered Nurse      Signature:  Elly Russo RN

## 2023-06-03 NOTE — DISCHARGE INSTRUCTIONS
BEHAVIORAL HEALTH NURSING DISCHARGE NOTE      The following personal items collected during your admission are returned to you:   Dental Appliance:    Vision:    Hearing Aid:    Kriss Garzonimer:    Clothing:    Other Valuables:    Valuables sent to safe:        PATIENT INSTRUCTIONS:    What to do at Home:    Recommended diet: regular diet,     Recommended activity: activity as tolerated,     Follow-up with Dr. Breanna Kamara SOP program Wednesday 6/7 at 10 am. If you have problems relating to your recovery, call your physician. The discharge information has been reviewed with the patient. The patient verbalized understanding.

## 2023-06-03 NOTE — BH NOTE
DX: Severe episode of recurrent major depressive disorder, without psychotic features. Voluntary. She spent the evening in her room resting with her eyes closed. PRNs given were Trazodone 100 mg PO for sleep. She denied SI/HI/AVH and pain. [place]. She ate 100% of the evening snack. She slept 10 hours 15 minutes. No distress noted during this shift. Flor Felipe anticipates discharge at 1300 this afternoon.

## 2023-06-04 PROBLEM — R45.851 SUICIDAL IDEATIONS: Status: ACTIVE | Noted: 2023-06-04

## 2023-06-08 ENCOUNTER — HOSPITAL ENCOUNTER (OUTPATIENT)
Facility: HOSPITAL | Age: 68
End: 2023-06-08
Payer: MEDICARE

## 2023-06-08 PROCEDURE — 90853 GROUP PSYCHOTHERAPY: CPT

## 2023-06-08 RX ORDER — QUETIAPINE FUMARATE 50 MG/1
50 TABLET, FILM COATED ORAL
Qty: 30 TABLET | Refills: 2 | Status: SHIPPED | OUTPATIENT
Start: 2023-06-08

## 2023-06-08 NOTE — PROGRESS NOTES
SOP EVALUATION ADDENDUM    PRESENTING PROBLEMS/INTERVAL HISTORY:  Just hospitalized at [facility] (5/31--6/3/23) due to worsening depression including poor N/V functioning and some vague SI and strong feelings of hopelessness. She'd taken a small OD of Seroquel (200 mg total) to try and sleep, but she'd also written some goodbye-type notes as well. Added Abilify to the Effexor XR she's been on for several years, and also added Vistaril (now causing blurred vision) and Trazodone (not helping since D/C). Mood has been much better, but she's still dysphoric. Also has a very low sense of self-worth (\"stupid and dumb\") that impacts her mood greatly. Will change the sleep med back to Quetiapine 50 mg, and have her use the Propranolol for anxiety, PRN (takes it for palpitations and HA's). Needs a more intensive level of outpatient Tx to avoid decompensation. MEDICATIONS:   Current Outpatient Medications   Medication Sig    hydrOXYzine pamoate (VISTARIL) 25 MG capsule Take 1 capsule by mouth 2 times daily as needed for Anxiety    traZODone (DESYREL) 100 MG tablet Take 1 tablet by mouth nightly as needed for Sleep    ARIPiprazole (ABILIFY) 2 MG tablet Take 1 tablet by mouth daily    lisinopril-hydroCHLOROthiazide (PRINZIDE;ZESTORETIC) 10-12.5 MG per tablet     magnesium oxide (MAG-OX) 400 MG tablet Take 1 tablet by mouth daily    venlafaxine (EFFEXOR XR) 150 MG extended release capsule Take 1 capsule by mouth daily    propranolol (INDERAL) 40 MG tablet Take 1 tablet by mouth 2 times daily as needed (headache or palpitations)     No current facility-administered medications for this encounter.         MEDICAL AND SOCIAL HISTORY: No changes from the referenced evaluation    ALLERGIES: Morphine; Hydrocodone    MENTAL STATUS UPDATE: ([facility] in [place])  Appearance:   Neat   Disheveled  Odiferous   Appropriate  Orientation:   Time  Place  Person  Speech:   Coherent  Pressured  Garbled/Slurred  Loud   Soft  Mute  Memory:   Intact

## 2023-06-09 NOTE — BH NOTE
Group Therapy Note    Date: 6/8/2023    Group Start Time: 10:00 AM  Group End Time: 10:50 AM  Group Topic: Psychotherapy    [address] OP    Raissa Serrano LCSW        Group Therapy Note      Focus of session was based on handout about the 8 executive function skills. We spoke about what they are which are impulse control, self-monitoring, time management, flexibility, organization, working memory, task initiation, and planning/prioritizing. We spoke about which area is lacking and how patient can build on it and manage it. Behavioral Health Daily Check In form revealed that patient is not experiencing SI/HI thoughts or plans, remaining abstinent from drugs and alcohol, and report's goal today of \"talk out things that are a problem to me. \"      Patient's Goal:  1. Dep F 33.2  We will work to establish therapeutic rapport    Notes: Phillip Hanks did well in group and shared her thoughts and feelings easily. She spoke about how she is trying to learn how to manage the depression and how to cope with the consequences of being in the hospital.  She was open to feedback and support and she did well in group session. Status After Intervention:  Unchanged    Participation Level: Active Listener and Interactive    Participation Quality: Appropriate, Attentive, and Sharing      Speech:  normal      Thought Process/Content: Logical      Affective Functioning: Congruent      Mood: anxious and depressed      Level of consciousness:  Alert, Oriented x4, and Attentive      Response to Learning: Able to verbalize current knowledge/experience and Capable of insight      Endings: None Reported    Modes of Intervention: Education, Support, Socialization, and Exploration      Discipline Responsible: /Counselor      Signature:   Daniel Serrano LCSW
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
daily    Box Score: 0 10. Has a relative, friend, doctor, or another health worker been concerned about your drinking or suggested you cut down?  (0) No  (2) Yes, but not in the last year  (4) Yes, during the last year        Box Score: 0   Scoring Key  8 to 15- simple advice focused on the reduction of hazardous drinking  16 to 19- brief counseling and continued monitoring  20 or above- further diagnostic evaluation for alcohol dependence     Record total of specific items here:      *This form is adapted from the [address] [place], [place] [address]s [facility]) Alcohol Use Disorders Identification Test (AUDIT) form, For more information, please see \"AUDIT- The Alcohol Use Disorders Identification Test: Guidelines for Use in [email or web address]      Checklist for Aggression Risk     Present Status Yes/No Wt. Score   1. Admission symptoms related to violence (actual assault, specific threat, attempted assault) no (3)    2. Specific identified victim * no (3)    3. Specific identified plan: no (3)    4. Access to or possession of means to carry out plan (available weapons) * no (3)    5. Intoxicated (alcohol, cocaine, crack or hallucinogens) no (3)    6. Acts upon paranoid ideation no (2)    7. Exhibits command auditory hallucinations no (2)    8. Currently making threats* no (2)    9. Current physical agitation* no (2)    10. Non-communicative no (2)    11. Hallucinating no (1)    12. Paranoid Thoughts no (1)    13. Medication Non-compliance no (1)      Environmental Factors Yes/No Wt. Score     Previous Clinical History Yes/No Wt. Score   1. Diagnosis of neurological impairment* no (3)    2. History of arrest/conviction for violent act (including arson) no (3)    3. History of harming other clients or staff * no (3)    4. History of non-compliance no (2)    5. History of arrest/conviction for violent crime within past year no (2)    6.  History of paranoid diagnosis

## 2023-06-15 ENCOUNTER — HOSPITAL ENCOUNTER (OUTPATIENT)
Facility: HOSPITAL | Age: 68
Discharge: HOME | End: 2023-06-18
Payer: MEDICARE

## 2023-06-15 PROCEDURE — 90853 GROUP PSYCHOTHERAPY: CPT

## 2023-06-15 NOTE — SUICIDE SAFETY PLAN
SAFETY PLAN    A suicide Safety Plan is a document that supports someone when they are having thoughts of suicide. Warning Signs that indicate a suicidal crisis may be developing: What (situations, thoughts, feelings, body sensations, behaviors, etc.) do you experience that lets you know you are beginning to think about suicide? 1. Pt states she does not think about it   2.   3. Internal Coping Strategies:  What things can I do (relaxation techniques, hobbies, physical activities, etc.) to take my mind off my problems without contacting another person? 1. Go to [facility]  2. Cut grass  3. Play games on tablet    People and social settings that provide distraction: Who can I call or where can I go to distract me? 1. Name:Pastor Annamarie Quinn, [employer]   Phone:   2. Name:Nicky Rosa , neighbor   Phone:    3. Place:             4. Place:     People whom I can ask for help: Who can I call when I need help - for example, friends, family, clergy, someone else? 1. Name: Trey Bermudez Phone: [phone]  2. Name:   Phone:   3. Name:   Phone:     Professionals or [address] [place] [address] I can contact during a crisis: Who can I call for help - for example, my doctor, my psychiatrist, my psychologist, a mental health provider, a suicide hotline? 1. Clinician Name: Piper BOWENB:[phone]       Clinician Pager or Emergency Contact #:     2. Clinician Name:    Phone:       Clinician Pager or Emergency Contact #:     3. Suicide Prevention Lifeline: 3-889-790-TALK (7051)    4. [address] [place] [address] Emergency Services -  for example, [facility]line[facility]: 3-745.905.6692      Emergency Services Address:       Emergency Services Phone: [phone]    Making the environment safe: How can I make my environment (house/apartment/living space) safer? For example, can I remove guns, medications, and other items?   1.no

## 2023-06-16 NOTE — BH NOTE
[facility] Outpatient Program  Group Therapy  Initial Treatment Plan       PROBLEM  NUMBER 1.  PROBLEM: Depression     LONG TERM GOALS (LTG) / DISCHARGE CRITERIA:  Develop the ability to recognize, accept, & cope with feelings of depression. Alleviate depressed mood & return to previous level of effective functioning   Date Established Date Met, Continued, Revised or Discontinued STG  Letter SHORT TERM GOALS (STG)  (What Client Will Do or Accomplish) By   Target Date   6/8/2023  A Pt will identify and share with group thoughts that contribute to depressed mood and outlook and identify a counter thought 7/3/2023   6/8/2023  B     Pt will identify and share with group behaviors that contribute to depressed mood and identify an alternative behavior. 7/5/2023 6/8/2023  C  Pt will identify triggers to recent setback and identify strategies that can help her manage setbacks like this in the future.     7/4/2023 6/8/2023  D Pt will identify times when she has overreacted emotionally and the consequences of those reactions and reasons why she wants to learn to slow down emotionally.  7/3/2024   6/8/2023  E Pt will identify times she has been able to use willingness to turn towards emotions and symptoms and how acceptance of the moment has helped her get through crises 7/6/2023 6/8/2023  F   Pt will identify triggers to recent hospitalization and identify strategies that can help her manage setbacks like this in the future.  7/5/2023 6/8/2023  G Pt will identify how she can build on her self worth and acceptance in order to prevent future setbacks.  7/3/2023   6/8/2023  H    Pt will begin to participate in social contacts & initiate communication of needs & desires and report to group her progress.    7/7/2023 6/8/2023  I Pt will verbalize belief in herself that she has the ability to make thought out decisions and it is a goal she is willing to work towards.      7/5/2023
Group Therapy Note    Date: 6/15/2023    Group Start Time: 10:00 AM  Group End Time: 10:50 AM  Group Topic: Psychotherapy    [address] [facility]    Raissa MACHUCA Zach, MANDEEPW        Group Therapy Note    Focus of session was based on article titled The Meaning of Fear in Three Acronyms: Anxiety Causes and Solutions by Adina Keene. We spoke about the acronym False Evidence Appearing Real and how we often get caught up in our anxiety because of faulty thoughts.   We spoke about the need to check on what we are thinking and if we are being accurate. We spoke about how we can stay focused on an action plan to help relieve anxiety if our fears, both big and small, come to be true. Behavioral Health Daily Check In form revealed that patient is not experiencing SI/HI thoughts or plans, remaining abstinent from drugs and alcohol, and report's goal today of  \"working through the depression. \"      Patient's Goal:  1. Dep F 33.2  H.  Pt will begin to participate in social contacts & initiate communication of needs & desires and report to group her progress.     Notes: Amanda Ogden participated in group with prompting. She spoke about how she has been able to recognize lately how much she can get consumed by other peoples problems. She spoke about her knowledge about herself that she is [place] fixer\" and she has been very anxious and depressed about her daughters problem with alcohol. We spoke about some solutions for improving her self care. Status After Intervention:  Improved    Participation Level:  Active Listener and Interactive    Participation Quality: Appropriate, Attentive, Sharing, and Supportive      Speech:  normal      Thought Process/Content: Logical      Affective Functioning: Congruent      Mood: anxious and depressed      Level of consciousness:  Alert, Oriented x4, and Attentive      Response to Learning: Able to verbalize current
Group Therapy Note    Date: 6/15/2023    Group Start Time: 11:00 AM  Group End Time: 11:50 AM  Group Topic: Psychotherapy    [address], [facility]        Group Therapy Note    Focus of session was based on the handout Everything Is Awful and I'm not Okay.   We spoke about how this handout leads us to ask questions to help us see what we can possibly do to take care of ourselves. We spoke about the questions range from physical needs to emotional and mental needs. We spoke about how this can be useful when we are in crisis and need some simple directions that we may be unable to even think about due to the crisis. We spoke about how group members can use this tool and how they may be willing to use it       Patient's Goal:  1Dep. F 33.2  F   Pt will identify triggers to recent hospitalization and identify strategies that can help her manage setbacks like this in the future.     Notes:  Pt came into group a few minutes late as she had been working. 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 She shared that she left yesterday early as she thought she had upset two group members. We discussed this further and strategies to assist with this if it should happen again. Status After Intervention:  Improved    Participation Level:  Active Listener and Interactive    Participation Quality: Appropriate      Speech:  normal      Thought Process/Content: Logical      Affective Functioning: Congruent      Mood: euthymic      Level of consciousness:  Alert, Oriented x4, and Attentive      Response to Learning: Able to verbalize current knowledge/experience, Able to verbalize/acknowledge new learning, Able to retain information, Capable of insight, Able to change behavior, and Progressing to goal      Endings: None Reported    Modes of Intervention: Education, Support, Socialization,
Problem-solving, and Activity      Discipline Responsible: /Counselor      Signature:  Dilia Pedersen LCSW

## 2023-06-22 ENCOUNTER — HOSPITAL ENCOUNTER (OUTPATIENT)
Facility: HOSPITAL | Age: 68
Discharge: HOME | End: 2023-06-25
Payer: MEDICARE

## 2023-06-22 PROCEDURE — 90853 GROUP PSYCHOTHERAPY: CPT

## 2023-06-23 ENCOUNTER — OFFICE VISIT (OUTPATIENT)
Age: 68
End: 2023-06-23

## 2023-06-23 VITALS
DIASTOLIC BLOOD PRESSURE: 68 MMHG | RESPIRATION RATE: 16 BRPM | HEIGHT: 64 IN | OXYGEN SATURATION: 95 % | WEIGHT: 194.6 LBS | HEART RATE: 95 BPM | SYSTOLIC BLOOD PRESSURE: 92 MMHG | BODY MASS INDEX: 33.22 KG/M2

## 2023-06-23 DIAGNOSIS — R19.7 DIARRHEA, UNSPECIFIED TYPE: ICD-10-CM

## 2023-06-23 DIAGNOSIS — R10.2 ABDOMINAL PAIN, SUPRAPUBIC: ICD-10-CM

## 2023-06-23 DIAGNOSIS — R00.2 PALPITATIONS: ICD-10-CM

## 2023-06-23 DIAGNOSIS — I10 HTN (HYPERTENSION), BENIGN: ICD-10-CM

## 2023-06-23 DIAGNOSIS — F33.2 SEVERE EPISODE OF RECURRENT MAJOR DEPRESSIVE DISORDER, WITHOUT PSYCHOTIC FEATURES (HCC): ICD-10-CM

## 2023-06-23 DIAGNOSIS — Z00.00 MEDICARE ANNUAL WELLNESS VISIT, SUBSEQUENT: Primary | ICD-10-CM

## 2023-06-23 RX ORDER — WHEAT DEXTRIN 3 G/3.8 G
4 POWDER (GRAM) ORAL EVERY EVENING
Qty: 475 G | Refills: 11 | Status: SHIPPED | OUTPATIENT
Start: 2023-06-23

## 2023-06-23 SDOH — ECONOMIC STABILITY: HOUSING INSECURITY
IN THE LAST 12 MONTHS, WAS THERE A TIME WHEN YOU DID NOT HAVE A STEADY PLACE TO SLEEP OR SLEPT IN A SHELTER (INCLUDING NOW)?: NO

## 2023-06-23 SDOH — ECONOMIC STABILITY: FOOD INSECURITY: WITHIN THE PAST 12 MONTHS, YOU WORRIED THAT YOUR FOOD WOULD RUN OUT BEFORE YOU GOT MONEY TO BUY MORE.: NEVER TRUE

## 2023-06-23 SDOH — ECONOMIC STABILITY: INCOME INSECURITY: HOW HARD IS IT FOR YOU TO PAY FOR THE VERY BASICS LIKE FOOD, HOUSING, MEDICAL CARE, AND HEATING?: NOT HARD AT ALL

## 2023-06-23 SDOH — ECONOMIC STABILITY: FOOD INSECURITY: WITHIN THE PAST 12 MONTHS, THE FOOD YOU BOUGHT JUST DIDN'T LAST AND YOU DIDN'T HAVE MONEY TO GET MORE.: NEVER TRUE

## 2023-06-23 ASSESSMENT — PATIENT HEALTH QUESTIONNAIRE - PHQ9
SUM OF ALL RESPONSES TO PHQ QUESTIONS 1-9: 21
SUM OF ALL RESPONSES TO PHQ9 QUESTIONS 1 & 2: 6
7. TROUBLE CONCENTRATING ON THINGS, SUCH AS READING THE NEWSPAPER OR WATCHING TELEVISION: 3
4. FEELING TIRED OR HAVING LITTLE ENERGY: 3
10. IF YOU CHECKED OFF ANY PROBLEMS, HOW DIFFICULT HAVE THESE PROBLEMS MADE IT FOR YOU TO DO YOUR WORK, TAKE CARE OF THINGS AT HOME, OR GET ALONG WITH OTHER PEOPLE: 3
1. LITTLE INTEREST OR PLEASURE IN DOING THINGS: 3
SUM OF ALL RESPONSES TO PHQ QUESTIONS 1-9: 21
9. THOUGHTS THAT YOU WOULD BE BETTER OFF DEAD, OR OF HURTING YOURSELF: 0
8. MOVING OR SPEAKING SO SLOWLY THAT OTHER PEOPLE COULD HAVE NOTICED. OR THE OPPOSITE, BEING SO FIGETY OR RESTLESS THAT YOU HAVE BEEN MOVING AROUND A LOT MORE THAN USUAL: 0
SUM OF ALL RESPONSES TO PHQ QUESTIONS 1-9: 21
5. POOR APPETITE OR OVEREATING: 3
6. FEELING BAD ABOUT YOURSELF - OR THAT YOU ARE A FAILURE OR HAVE LET YOURSELF OR YOUR FAMILY DOWN: 3
SUM OF ALL RESPONSES TO PHQ QUESTIONS 1-9: 21
3. TROUBLE FALLING OR STAYING ASLEEP: 3
2. FEELING DOWN, DEPRESSED OR HOPELESS: 3

## 2023-06-23 ASSESSMENT — LIFESTYLE VARIABLES
HOW OFTEN DO YOU HAVE A DRINK CONTAINING ALCOHOL: NEVER
HOW MANY STANDARD DRINKS CONTAINING ALCOHOL DO YOU HAVE ON A TYPICAL DAY: PATIENT DOES NOT DRINK

## 2023-06-23 ASSESSMENT — COLUMBIA-SUICIDE SEVERITY RATING SCALE - C-SSRS
6. HAVE YOU EVER DONE ANYTHING, STARTED TO DO ANYTHING, OR PREPARED TO DO ANYTHING TO END YOUR LIFE?: NO
2. HAVE YOU ACTUALLY HAD ANY THOUGHTS OF KILLING YOURSELF?: NO
1. WITHIN THE PAST MONTH, HAVE YOU WISHED YOU WERE DEAD OR WISHED YOU COULD GO TO SLEEP AND NOT WAKE UP?: YES

## 2023-06-23 NOTE — PROGRESS NOTES
Alexandro Frederick - [phone]
instructions/AVS.  Recommended screening schedule for the next 5-10 years is provided to the patient in written form: see Patient Instructions/AVS.     Return in 1 year (on 6/23/2024). Subjective     Patient's complete Health Risk Assessment and screening values have been reviewed and are found in Flowsheets. The following problems were reviewed today and where indicated follow up appointments were made and/or referrals ordered. Positive Risk Factor Screenings with Interventions:        Depression:  PHQ-2 Score: 6  PHQ-9 Total Score: 21    Interpretation:   1-4 = minimal  5-9 = mild  10-14 = moderate  15-19 = moderately severe  20-27 = severe  Interventions:  Patient advised to follow-up in this office for further evaluation and treatment            Weight and Activity:  Physical Activity: Inactive    Days of Exercise per Week: 0 days    Minutes of Exercise per Session: 0 min     On average, how many days per week do you engage in moderate to strenuous exercise (like a brisk walk)?: 0 days  Have you lost any weight without trying in the past 3 months?: No  Body mass index is 33.4 kg/m². (!) Abnormal    Inactivity Interventions:  Patient advised to follow up in the office for further evaluation and treatment  Obesity Interventions:  Patient advised to follow-up in this office for further evaluation and treatment          Dentist Screen:  Have you seen the dentist within the past year?: (!) No    Intervention:  Advised to schedule with their dentist    Hearing Screen:  Do you or your family notice any trouble with your hearing that hasn't been managed with hearing aids?: (!) Yes    Interventions:  Referred to Audiology    Vision Screen:  Do you have difficulty driving, watching TV, or doing any of your daily activities because of your eyesight?: No  Have you had an eye exam within the past year?: (!) No  No results found.     Interventions:   Patient encouraged to make appointment with their eye

## 2023-06-24 LAB
ALBUMIN SERPL-MCNC: 4 G/DL (ref 3.5–5)
ALBUMIN/GLOB SERPL: 1.7 (ref 1.1–2.2)
ALP SERPL-CCNC: 114 U/L (ref 45–117)
ALT SERPL-CCNC: 35 U/L (ref 12–78)
ANION GAP SERPL CALC-SCNC: 8 MMOL/L (ref 5–15)
AST SERPL-CCNC: 13 U/L (ref 15–37)
BASOPHILS # BLD: 0 K/UL (ref 0–0.1)
BASOPHILS NFR BLD: 1 % (ref 0–1)
BILIRUB SERPL-MCNC: 0.4 MG/DL (ref 0.2–1)
BUN SERPL-MCNC: 12 MG/DL (ref 6–20)
BUN/CREAT SERPL: 18 (ref 12–20)
CALCIUM SERPL-MCNC: 9.2 MG/DL (ref 8.5–10.1)
CHLORIDE SERPL-SCNC: 106 MMOL/L (ref 97–108)
CO2 SERPL-SCNC: 29 MMOL/L (ref 21–32)
CREAT SERPL-MCNC: 0.68 MG/DL (ref 0.55–1.02)
DIFFERENTIAL METHOD BLD: NORMAL
EOSINOPHIL # BLD: 0.1 K/UL (ref 0–0.4)
EOSINOPHIL NFR BLD: 2 % (ref 0–7)
ERYTHROCYTE [DISTWIDTH] IN BLOOD BY AUTOMATED COUNT: 12.9 % (ref 11.5–14.5)
GLOBULIN SER CALC-MCNC: 2.4 G/DL (ref 2–4)
GLUCOSE SERPL-MCNC: 117 MG/DL (ref 65–100)
HCT VFR BLD AUTO: 44.8 % (ref 35–47)
HGB BLD-MCNC: 14 G/DL (ref 11.5–16)
IMM GRANULOCYTES # BLD AUTO: 0 K/UL (ref 0–0.04)
IMM GRANULOCYTES NFR BLD AUTO: 0 % (ref 0–0.5)
LYMPHOCYTES # BLD: 1.4 K/UL (ref 0.8–3.5)
LYMPHOCYTES NFR BLD: 20 % (ref 12–49)
MCH RBC QN AUTO: 29.4 PG (ref 26–34)
MCHC RBC AUTO-ENTMCNC: 31.3 G/DL (ref 30–36.5)
MCV RBC AUTO: 94.1 FL (ref 80–99)
MONOCYTES # BLD: 0.7 K/UL (ref 0–1)
MONOCYTES NFR BLD: 9 % (ref 5–13)
NEUTS SEG # BLD: 5 K/UL (ref 1.8–8)
NEUTS SEG NFR BLD: 68 % (ref 32–75)
NRBC # BLD: 0 K/UL (ref 0–0.01)
NRBC BLD-RTO: 0 PER 100 WBC
PLATELET # BLD AUTO: 282 K/UL (ref 150–400)
PMV BLD AUTO: 11.1 FL (ref 8.9–12.9)
POTASSIUM SERPL-SCNC: 3.7 MMOL/L (ref 3.5–5.1)
PROT SERPL-MCNC: 6.4 G/DL (ref 6.4–8.2)
RBC # BLD AUTO: 4.76 M/UL (ref 3.8–5.2)
SODIUM SERPL-SCNC: 143 MMOL/L (ref 136–145)
TSH SERPL DL<=0.05 MIU/L-ACNC: 1.17 UIU/ML (ref 0.36–3.74)
WBC # BLD AUTO: 7.3 K/UL (ref 3.6–11)

## 2023-06-28 ENCOUNTER — NURSE TRIAGE (OUTPATIENT)
Dept: OTHER | Facility: CLINIC | Age: 68
End: 2023-06-28

## 2023-06-29 ENCOUNTER — TELEPHONE (OUTPATIENT)
Age: 68
End: 2023-06-29

## 2023-06-29 ENCOUNTER — HOSPITAL ENCOUNTER (OUTPATIENT)
Facility: HOSPITAL | Age: 68
End: 2023-06-29
Payer: MEDICARE

## 2023-06-29 PROCEDURE — 90853 GROUP PSYCHOTHERAPY: CPT

## 2023-10-03 RX ORDER — QUETIAPINE FUMARATE 50 MG/1
50 TABLET, FILM COATED ORAL
Qty: 30 TABLET | Refills: 1 | Status: SHIPPED | OUTPATIENT
Start: 2023-10-03

## 2023-11-07 ENCOUNTER — OFFICE VISIT (OUTPATIENT)
Age: 68
End: 2023-11-07
Payer: MEDICARE

## 2023-11-07 VITALS — RESPIRATION RATE: 20 BRPM | TEMPERATURE: 97.3 F | HEART RATE: 112 BPM | OXYGEN SATURATION: 96 %

## 2023-11-07 DIAGNOSIS — R05.9 COUGH, UNSPECIFIED TYPE: Primary | ICD-10-CM

## 2023-11-07 DIAGNOSIS — R06.2 WHEEZING: ICD-10-CM

## 2023-11-07 DIAGNOSIS — J45.21 MILD INTERMITTENT ASTHMATIC BRONCHITIS WITH ACUTE EXACERBATION: ICD-10-CM

## 2023-11-07 LAB
EXP DATE SOLUTION: NORMAL
EXP DATE SWAB: NORMAL
EXPIRATION DATE: NORMAL
LOT NUMBER POC: NORMAL
LOT NUMBER SOLUTION: NORMAL
LOT NUMBER SWAB: NORMAL
SARS-COV-2 RNA, POC: NEGATIVE

## 2023-11-07 PROCEDURE — 99213 OFFICE O/P EST LOW 20 MIN: CPT | Performed by: NURSE PRACTITIONER

## 2023-11-07 PROCEDURE — 87635 SARS-COV-2 COVID-19 AMP PRB: CPT | Performed by: NURSE PRACTITIONER

## 2023-11-07 PROCEDURE — 1123F ACP DISCUSS/DSCN MKR DOCD: CPT | Performed by: NURSE PRACTITIONER

## 2023-11-07 RX ORDER — ALBUTEROL SULFATE 90 UG/1
2 AEROSOL, METERED RESPIRATORY (INHALATION) 4 TIMES DAILY PRN
Qty: 18 G | Refills: 5 | Status: SHIPPED | OUTPATIENT
Start: 2023-11-07

## 2023-11-07 RX ORDER — AZITHROMYCIN 250 MG/1
250 TABLET, FILM COATED ORAL SEE ADMIN INSTRUCTIONS
Qty: 6 TABLET | Refills: 0 | Status: SHIPPED | OUTPATIENT
Start: 2023-11-07 | End: 2023-11-12

## 2023-11-07 RX ORDER — METHYLPREDNISOLONE 4 MG/1
TABLET ORAL
Qty: 1 KIT | Refills: 0 | Status: SHIPPED | OUTPATIENT
Start: 2023-11-07 | End: 2023-11-13

## 2023-11-07 RX ORDER — BENZONATATE 200 MG/1
200 CAPSULE ORAL 3 TIMES DAILY PRN
Qty: 30 CAPSULE | Refills: 0 | Status: SHIPPED | OUTPATIENT
Start: 2023-11-07 | End: 2023-11-14

## 2023-11-07 ASSESSMENT — PATIENT HEALTH QUESTIONNAIRE - PHQ9
SUM OF ALL RESPONSES TO PHQ9 QUESTIONS 1 & 2: 0
SUM OF ALL RESPONSES TO PHQ QUESTIONS 1-9: 0
SUM OF ALL RESPONSES TO PHQ QUESTIONS 1-9: 0
2. FEELING DOWN, DEPRESSED OR HOPELESS: 0
SUM OF ALL RESPONSES TO PHQ QUESTIONS 1-9: 0
SUM OF ALL RESPONSES TO PHQ QUESTIONS 1-9: 0
1. LITTLE INTEREST OR PLEASURE IN DOING THINGS: 0

## 2023-11-30 NOTE — ED PROVIDER NOTES
Radiologic Studies -   No orders to display     [unfilled]  [unfilled]      Medical Decision Making   I am the first provider for this patient. I reviewed the vital signs, available nursing notes, past medical history, past surgical history, family history and social history. Vital Signs-Reviewed the patient's vital signs. Patient Vitals for the past 12 hrs:   Temp Pulse Resp BP SpO2   05/31/23 1503 -- 73 13 128/77 --   05/31/23 1434 -- 74 19 118/77 --   05/31/23 1400 -- 72 13 120/76 97 %   05/31/23 1330 -- 77 14 (!) 143/78 --   05/31/23 1300 -- 74 13 134/62 --   05/31/23 1230 -- 75 14 (!) 144/75 --   05/31/23 1145 -- 78 13 -- --   05/31/23 1130 -- 77 14 138/62 --   05/31/23 1057 -- 80 17 126/68 --   05/31/23 1030 -- 82 14 134/84 --   05/31/23 1001 -- 79 13 134/77 --   05/31/23 0939 -- 82 18 131/63 --   05/31/23 0904 98 °F (36.7 °C) 95 20 (!) 99/57 95 %       Pulse Oximetry Analysis - 97% on RA    Cardiac Monitor:   Rate: 73 bpm  Rhythm: Normal Sinus Rhythm        ED EKG interpretation:08:58  Rhythm: normal sinus rhythm; and regular . Rate (approx.): 96; Axis: normal; KS Interval: normal; QRS interval: normal ; ST/T wave: non-specific changes; This EKG was interpreted by Monica Jackson MD,ED Provider. Records Reviewed: Nursing Notes    Provider Notes (Medical Decision Making):   Patient with low-dose Seroquel overdose. Most likely nontoxic. Per poison control will observe for 6 hours and obtain basic lab work. Will obtain basic psych clearance lab work as well. Will obtain EKG. Will consult [facility] M[facility] for most likely inpatient psychiatric admission. ED Course:   Initial assessment performed. The patients presenting problems have been discussed, and they are in agreement with the care plan formulated and outlined with them. I have encouraged them to ask questions as they arise throughout their visit. ED Course as of 05/31/23 1516   Wed May 31, 2023   0957 Patient medically cleared.   Patient No indicators present

## 2023-12-04 NOTE — TELEPHONE ENCOUNTER
Pt called with urgency; she needs a refill of Quetiapine called into [facility] in [place]. She is out of medication and cannot sleep without the meds.   Her # [phone]

## 2023-12-05 RX ORDER — QUETIAPINE FUMARATE 50 MG/1
50 TABLET, FILM COATED ORAL
Qty: 30 TABLET | Refills: 1 | Status: SHIPPED | OUTPATIENT
Start: 2023-12-05

## 2023-12-11 ENCOUNTER — OFFICE VISIT (OUTPATIENT)
Age: 68
End: 2023-12-11
Payer: MEDICARE

## 2023-12-11 VITALS — OXYGEN SATURATION: 97 % | TEMPERATURE: 97.8 F | HEART RATE: 92 BPM | RESPIRATION RATE: 18 BRPM

## 2023-12-11 DIAGNOSIS — J06.9 VIRAL URI: ICD-10-CM

## 2023-12-11 DIAGNOSIS — R05.2 SUBACUTE COUGH: Primary | ICD-10-CM

## 2023-12-11 DIAGNOSIS — R06.2 WHEEZE: ICD-10-CM

## 2023-12-11 PROBLEM — E66.01 SEVERE OBESITY (BMI 35.0-39.9) WITH COMORBIDITY (HCC): Status: RESOLVED | Noted: 2022-05-23 | Resolved: 2023-12-11

## 2023-12-11 PROBLEM — R45.851 SUICIDAL IDEATIONS: Status: RESOLVED | Noted: 2023-06-04 | Resolved: 2023-12-11

## 2023-12-11 LAB
EXP DATE SOLUTION: NORMAL
EXP DATE SWAB: NORMAL
EXPIRATION DATE: NORMAL
GROUP A STREP ANTIGEN, POC: NEGATIVE
INFLUENZA A ANTIGEN, POC: NEGATIVE
INFLUENZA B ANTIGEN, POC: NEGATIVE
LOT NUMBER POC: NORMAL
LOT NUMBER SOLUTION: NORMAL
LOT NUMBER SWAB: NORMAL
SARS-COV-2 RNA, POC: NEGATIVE
VALID INTERNAL CONTROL, POC: YES
VALID INTERNAL CONTROL, POC: YES

## 2023-12-11 PROCEDURE — 87502 INFLUENZA DNA AMP PROBE: CPT | Performed by: FAMILY MEDICINE

## 2023-12-11 PROCEDURE — 87635 SARS-COV-2 COVID-19 AMP PRB: CPT | Performed by: FAMILY MEDICINE

## 2023-12-11 PROCEDURE — 87880 STREP A ASSAY W/OPTIC: CPT | Performed by: FAMILY MEDICINE

## 2023-12-11 PROCEDURE — 99213 OFFICE O/P EST LOW 20 MIN: CPT | Performed by: FAMILY MEDICINE

## 2023-12-11 PROCEDURE — 1123F ACP DISCUSS/DSCN MKR DOCD: CPT | Performed by: FAMILY MEDICINE

## 2023-12-11 RX ORDER — PREDNISONE 20 MG/1
TABLET ORAL
Qty: 11 TABLET | Refills: 0 | Status: SHIPPED | OUTPATIENT
Start: 2023-12-11 | End: 2023-12-20

## 2023-12-11 ASSESSMENT — PATIENT HEALTH QUESTIONNAIRE - PHQ9
2. FEELING DOWN, DEPRESSED OR HOPELESS: 0
5. POOR APPETITE OR OVEREATING: 0
SUM OF ALL RESPONSES TO PHQ QUESTIONS 1-9: 0
SUM OF ALL RESPONSES TO PHQ QUESTIONS 1-9: 0
8. MOVING OR SPEAKING SO SLOWLY THAT OTHER PEOPLE COULD HAVE NOTICED. OR THE OPPOSITE, BEING SO FIGETY OR RESTLESS THAT YOU HAVE BEEN MOVING AROUND A LOT MORE THAN USUAL: 0
SUM OF ALL RESPONSES TO PHQ QUESTIONS 1-9: 0
10. IF YOU CHECKED OFF ANY PROBLEMS, HOW DIFFICULT HAVE THESE PROBLEMS MADE IT FOR YOU TO DO YOUR WORK, TAKE CARE OF THINGS AT HOME, OR GET ALONG WITH OTHER PEOPLE: 0
4. FEELING TIRED OR HAVING LITTLE ENERGY: 0
7. TROUBLE CONCENTRATING ON THINGS, SUCH AS READING THE NEWSPAPER OR WATCHING TELEVISION: 0
6. FEELING BAD ABOUT YOURSELF - OR THAT YOU ARE A FAILURE OR HAVE LET YOURSELF OR YOUR FAMILY DOWN: 0
SUM OF ALL RESPONSES TO PHQ QUESTIONS 1-9: 0
9. THOUGHTS THAT YOU WOULD BE BETTER OFF DEAD, OR OF HURTING YOURSELF: 0
SUM OF ALL RESPONSES TO PHQ QUESTIONS 1-9: 0
SUM OF ALL RESPONSES TO PHQ9 QUESTIONS 1 & 2: 0
1. LITTLE INTEREST OR PLEASURE IN DOING THINGS: 0
SUM OF ALL RESPONSES TO PHQ QUESTIONS 1-9: 0
SUM OF ALL RESPONSES TO PHQ QUESTIONS 1-9: 0
3. TROUBLE FALLING OR STAYING ASLEEP: 0
SUM OF ALL RESPONSES TO PHQ QUESTIONS 1-9: 0

## 2023-12-11 NOTE — PROGRESS NOTES
Geri Santacruz is a 76 y.o. female  Chief Complaint   Patient presents with    Cough     Pt reports having a cough since her last office visit 11/7/2023 and has had a sore throat for the past week. Other     Pt reports having left ear pain, running nose for the past week. HPI:      Hypertension and occ palpitations  Blood pressures good today. Current regimen: ACE inhibitor and thiazide diuretic, with PRN doses of low dose b-blocker. Patient denies chest pain, peripheral edema. EKG's have been reassuring. We tried to ore TFT's spring 2023, but never got drawn. Lab review:   Lab Results   Component Value Date     06/23/2023    K 3.7 06/23/2023     06/23/2023    CO2 29 06/23/2023    GLUCOSE 117 (H) 06/23/2023    BUN 12 06/23/2023    CREATININE 0.68 06/23/2023     Lab Results   Component Value Date    EDG1WRH 1.17 06/23/2023       Reviewed PMH, PSH, SH, Medications, allergies (see chart). Current Outpatient Medications   Medication Sig    QUEtiapine (SEROQUEL) 50 MG tablet Take 1 tablet by mouth nightly    albuterol sulfate HFA (VENTOLIN HFA) 108 (90 Base) MCG/ACT inhaler Inhale 2 puffs into the lungs 4 times daily as needed for Wheezing    Wheat Dextrin (BENEFIBER) POWD Take 4 g by mouth every evening Indications: stabilize bowel function    hydrOXYzine pamoate (VISTARIL) 25 MG capsule Take 1 capsule by mouth 2 times daily as needed for Anxiety    ARIPiprazole (ABILIFY) 2 MG tablet Take 1 tablet by mouth daily    lisinopril-hydroCHLOROthiazide (PRINZIDE;ZESTORETIC) 10-12.5 MG per tablet     magnesium oxide (MAG-OX) 400 MG tablet Take 1 tablet by mouth daily    venlafaxine (EFFEXOR XR) 150 MG extended release capsule Take 1 capsule by mouth daily    propranolol (INDERAL) 40 MG tablet Take 1 tablet by mouth 2 times daily as needed (headache or palpitations)     No current facility-administered medications for this visit.      ROS:   General: No fever, chills, or abnormal weight loss  Respiratory:

## 2024-01-19 ENCOUNTER — HOSPITAL ENCOUNTER (OUTPATIENT)
Facility: HOSPITAL | Age: 69
End: 2024-01-19
Payer: MEDICARE

## 2024-01-19 ENCOUNTER — OFFICE VISIT (OUTPATIENT)
Age: 69
End: 2024-01-19
Payer: MEDICARE

## 2024-01-19 VITALS — RESPIRATION RATE: 20 BRPM | HEART RATE: 109 BPM | OXYGEN SATURATION: 95 % | TEMPERATURE: 97.1 F

## 2024-01-19 DIAGNOSIS — J45.31 MILD PERSISTENT ASTHMA WITH EXACERBATION: ICD-10-CM

## 2024-01-19 DIAGNOSIS — R06.2 WHEEZING: ICD-10-CM

## 2024-01-19 DIAGNOSIS — R05.9 COUGH, UNSPECIFIED TYPE: Primary | ICD-10-CM

## 2024-01-19 DIAGNOSIS — R05.9 COUGH, UNSPECIFIED TYPE: ICD-10-CM

## 2024-01-19 PROCEDURE — 1123F ACP DISCUSS/DSCN MKR DOCD: CPT | Performed by: NURSE PRACTITIONER

## 2024-01-19 PROCEDURE — 71046 X-RAY EXAM CHEST 2 VIEWS: CPT

## 2024-01-19 PROCEDURE — 99213 OFFICE O/P EST LOW 20 MIN: CPT | Performed by: NURSE PRACTITIONER

## 2024-01-19 PROCEDURE — 87635 SARS-COV-2 COVID-19 AMP PRB: CPT | Performed by: NURSE PRACTITIONER

## 2024-01-19 RX ORDER — METHYLPREDNISOLONE 4 MG/1
TABLET ORAL
Qty: 1 KIT | Refills: 0 | Status: SHIPPED | OUTPATIENT
Start: 2024-01-19 | End: 2024-01-25

## 2024-01-19 ASSESSMENT — PATIENT HEALTH QUESTIONNAIRE - PHQ9
SUM OF ALL RESPONSES TO PHQ QUESTIONS 1-9: 0
SUM OF ALL RESPONSES TO PHQ9 QUESTIONS 1 & 2: 0
2. FEELING DOWN, DEPRESSED OR HOPELESS: 0
SUM OF ALL RESPONSES TO PHQ QUESTIONS 1-9: 0
1. LITTLE INTEREST OR PLEASURE IN DOING THINGS: 0
SUM OF ALL RESPONSES TO PHQ QUESTIONS 1-9: 0
SUM OF ALL RESPONSES TO PHQ QUESTIONS 1-9: 0

## 2024-01-19 NOTE — PROGRESS NOTES
Chief Complaint   Patient presents with    Cough     Hard cough x 1 month. Not getting any better. No fever/chills.          HPI:       is a 68 y.o. female.      New Issues:  She reports she has been sick since November.  Reports last fever was about a month ago.  Finds herself hacking and wheezing.  Reports \"asthma\" when she is sick.  Has an albuterol inhaler at home and is using this.  No recent fever.  Appears overwhelmed today.      Allergies   Allergen Reactions    Hydrocodone Itching and Rash    Morphine Rash       Current Outpatient Medications   Medication Sig Dispense Refill    QUEtiapine (SEROQUEL) 50 MG tablet Take 1 tablet by mouth nightly 30 tablet 1    albuterol sulfate HFA (VENTOLIN HFA) 108 (90 Base) MCG/ACT inhaler Inhale 2 puffs into the lungs 4 times daily as needed for Wheezing 18 g 5    Wheat Dextrin (BENEFIBER) POWD Take 4 g by mouth every evening Indications: stabilize bowel function 475 g 11    hydrOXYzine pamoate (VISTARIL) 25 MG capsule Take 1 capsule by mouth 2 times daily as needed for Anxiety 30 capsule 2    ARIPiprazole (ABILIFY) 2 MG tablet Take 1 tablet by mouth daily 30 tablet 2    lisinopril-hydroCHLOROthiazide (PRINZIDE;ZESTORETIC) 10-12.5 MG per tablet       magnesium oxide (MAG-OX) 400 MG tablet Take 1 tablet by mouth daily      venlafaxine (EFFEXOR XR) 150 MG extended release capsule Take 1 capsule by mouth daily      propranolol (INDERAL) 40 MG tablet Take 1 tablet by mouth 2 times daily as needed (headache or palpitations)       No current facility-administered medications for this visit.        Past Medical History:   Diagnosis Date    Autoimmune disease (HCC)     Autoimmune hepatitis (HCC)     Calculus of kidney     Menopause        Past Surgical History:   Procedure Laterality Date    HYSTERECTOMY (CERVIX STATUS UNKNOWN)      one ovary left    ORTHOPEDIC SURGERY      OVARY REMOVAL         Social History     Socioeconomic History    Marital status: Single   Tobacco

## 2024-01-19 NOTE — PROGRESS NOTES
Delroy Brand is a 68 y.o. female presenting for/with:    Chief Complaint   Patient presents with    Cough     Hard cough x 1 month. Not getting any better. No fever/chills.        Vitals:    01/19/24 1000   Pulse: (!) 109   Resp: 20   Temp: 97.1 °F (36.2 °C)   TempSrc: Temporal   SpO2: 95%       Pain Scale: /10  Pain Location:     \"Have you been to the ER, urgent care clinic since your last visit?  Hospitalized since your last visit?\"    NO    “Have you seen or consulted any other health care providers outside of [facility] since your last visit?”    NO                 1/19/2024    10:50 AM   PHQ-9    Little interest or pleasure in doing things 0   Feeling down, depressed, or hopeless 0   PHQ-2 Score 0   PHQ-9 Total Score 0           6/23/2023     7:30 AM 11/22/2021    12:00 AM 6/29/2021    12:00 AM 6/8/2021    12:00 AM   [facility] AMB LEARNING ASSESSMENT   Primary Learner Patient Patient Patient Patient   Primary Language ENGLISH ENGLISH ENGLISH ENGLISH   Learning Preference READING READING READING READING   Answered By self patient patient patient   Relationship to Learner SELF SELF SELF SELF            1/19/2024    10:50 AM   Amb Fall Risk Assessment and TUG Test   Do you feel unsteady or are you worried about falling?  no   2 or more falls in past year? no   Fall with injury in past year? no           1/19/2024    10:00 AM 12/11/2023     2:00 PM 11/7/2023     2:00 PM 6/23/2023     7:00 AM   ADL ASSESSMENT   Feeding yourself No Help Needed No Help Needed No Help Needed No Help Needed   Getting from bed to chair No Help Needed No Help Needed No Help Needed No Help Needed   Getting dressed No Help Needed No Help Needed No Help Needed No Help Needed   Bathing or showering No Help Needed No Help Needed No Help Needed No Help Needed   Walk across the room (includes cane/walker) No Help Needed No Help Needed No Help Needed No Help Needed   Using the telphone No Help Needed No Help Needed No Help Needed No

## 2024-01-24 DIAGNOSIS — J45.21 MILD INTERMITTENT ASTHMATIC BRONCHITIS WITH ACUTE EXACERBATION: ICD-10-CM

## 2024-01-24 DIAGNOSIS — R06.2 WHEEZING: ICD-10-CM

## 2024-01-24 NOTE — TELEPHONE ENCOUNTER
[phone] contact # michael Potts, would like a refill called into [facility] [place], [place] for the following meds:   albuterol sulfate HFA  108 MCG/ACT inhaler    Thanks,

## 2024-01-26 RX ORDER — ALBUTEROL SULFATE 90 UG/1
2 AEROSOL, METERED RESPIRATORY (INHALATION) 4 TIMES DAILY PRN
Qty: 18 G | Refills: 5 | Status: SHIPPED | OUTPATIENT
Start: 2024-01-26

## 2024-02-06 RX ORDER — QUETIAPINE FUMARATE 50 MG/1
50 TABLET, FILM COATED ORAL
Qty: 30 TABLET | Refills: 5 | Status: SHIPPED | OUTPATIENT
Start: 2024-02-06

## 2024-06-17 ENCOUNTER — TELEPHONE (OUTPATIENT)
Age: 69
End: 2024-06-17

## 2024-06-17 ENCOUNTER — TELEMEDICINE (OUTPATIENT)
Age: 69
End: 2024-06-17
Payer: MEDICARE

## 2024-06-17 DIAGNOSIS — R30.0 DYSURIA: ICD-10-CM

## 2024-06-17 DIAGNOSIS — R10.9 ABDOMINAL PRESSURE: ICD-10-CM

## 2024-06-17 DIAGNOSIS — N30.01 ACUTE CYSTITIS WITH HEMATURIA: Primary | ICD-10-CM

## 2024-06-17 DIAGNOSIS — R31.0 GROSS HEMATURIA: ICD-10-CM

## 2024-06-17 LAB
BILIRUBIN, URINE, POC: ABNORMAL
BLOOD URINE, POC: ABNORMAL
GLUCOSE URINE, POC: NEGATIVE
KETONES, URINE, POC: NEGATIVE
LEUKOCYTE ESTERASE, URINE, POC: ABNORMAL
NITRITE, URINE, POC: NEGATIVE
PH, URINE, POC: 5.5 (ref 4.6–8)
PROTEIN,URINE, POC: 100
SPECIFIC GRAVITY, URINE, POC: 1.03 (ref 1–1.03)
URINALYSIS CLARITY, POC: ABNORMAL
URINALYSIS COLOR, POC: ABNORMAL
UROBILINOGEN, POC: ABNORMAL

## 2024-06-17 PROCEDURE — 81003 URINALYSIS AUTO W/O SCOPE: CPT | Performed by: FAMILY MEDICINE

## 2024-06-17 PROCEDURE — 99442 PR PHYS/QHP TELEPHONE EVALUATION 11-20 MIN: CPT | Performed by: FAMILY MEDICINE

## 2024-06-17 ASSESSMENT — PATIENT HEALTH QUESTIONNAIRE - PHQ9
2. FEELING DOWN, DEPRESSED OR HOPELESS: NOT AT ALL
SUM OF ALL RESPONSES TO PHQ QUESTIONS 1-9: 0
SUM OF ALL RESPONSES TO PHQ9 QUESTIONS 1 & 2: 0
SUM OF ALL RESPONSES TO PHQ QUESTIONS 1-9: 0
1. LITTLE INTEREST OR PLEASURE IN DOING THINGS: NOT AT ALL

## 2024-06-17 NOTE — PROGRESS NOTES
Delroy Brand is a 68 y.o. female who was seen by synchronous (real-time) audio technology on 6/17/2024.     Subjective:   Dysuria (Lower abdominal pressure, dysuria x 3 days. Dark urine. )    Pt reports gradually darker urine color over past week, with 3d hx of bladder pressure and   Has had kidney stones before, but this doesn't feel like that.    PMH, SH, Medications/Allergies: reviewed, on chart.  Current Outpatient Medications   Medication Sig    QUEtiapine (SEROQUEL) 50 MG tablet Take 1 tablet by mouth nightly    albuterol sulfate HFA (VENTOLIN HFA) 108 (90 Base) MCG/ACT inhaler Inhale 2 puffs into the lungs 4 times daily as needed for Wheezing    Wheat Dextrin (BENEFIBER) POWD Take 4 g by mouth every evening Indications: stabilize bowel function    hydrOXYzine pamoate (VISTARIL) 25 MG capsule Take 1 capsule by mouth 2 times daily as needed for Anxiety    ARIPiprazole (ABILIFY) 2 MG tablet Take 1 tablet by mouth daily    lisinopril-hydroCHLOROthiazide (PRINZIDE;ZESTORETIC) 10-12.5 MG per tablet     magnesium oxide (MAG-OX) 400 MG tablet Take 1 tablet by mouth daily    venlafaxine (EFFEXOR XR) 150 MG extended release capsule Take 1 capsule by mouth daily    propranolol (INDERAL) 40 MG tablet Take 1 tablet by mouth 2 times daily as needed (headache or palpitations)     No current facility-administered medications for this visit.      Allergies   Allergen Reactions    Hydrocodone Itching and Rash    Morphine Rash     ROS:  Constitutional: No fever, chills or abnormal weight loss  Respiratory: No cough, SOB   CV: No chest pain or Palpitations    Objective:     VS review:  Wt Readings from Last 3 Encounters:   06/23/23 88.3 kg (194 lb 9.6 oz)   04/03/23 88.8 kg (195 lb 12.8 oz)   05/23/22 90.3 kg (199 lb)     BP Readings from Last 3 Encounters:   06/23/23 92/68   04/03/23 116/88   05/23/22 114/60       General: alert, cooperative, and no distress   Mental  status: mental status: alert, oriented to person, place,

## 2024-06-17 NOTE — TELEPHONE ENCOUNTER
Pt stated that her urine is dark and that she also seen some blood in her urine, please advise. Call back # [phone].

## 2024-06-17 NOTE — PROGRESS NOTES
Delroy Brand is a 68 y.o. female presenting for/with:    Chief Complaint   Patient presents with    Dysuria     Lower abdominal pressure, dysuria x 3 days. Dark urine.        There were no vitals filed for this visit.    Pain Scale: /10  Pain Location:     \"Have you been to the ER, urgent care clinic since your last visit?  Hospitalized since your last visit?\"    NO    “Have you seen or consulted any other health care providers outside of [facility] since your last visit?”    NO       Have you had a mammogram?”   NO    Date of last Mammogram: 6/6/2022 6/17/2024     2:35 PM   PHQ-9    Little interest or pleasure in doing things 0   Feeling down, depressed, or hopeless 0   PHQ-2 Score 0   PHQ-9 Total Score 0           6/23/2023     7:30 AM 11/22/2021    12:00 AM 6/29/2021    12:00 AM 6/8/2021    12:00 AM   [facility] AMB LEARNING ASSESSMENT   Primary Learner Patient Patient Patient Patient   Primary Language ENGLISH ENGLISH ENGLISH ENGLISH   Learning Preference READING READING READING READING   Answered By self patient patient patient   Relationship to Learner SELF SELF SELF SELF            6/17/2024     2:34 PM   Amb Fall Risk Assessment and TUG Test   Do you feel unsteady or are you worried about falling?  no   2 or more falls in past year? no   Fall with injury in past year? no           6/17/2024     2:00 PM 1/19/2024    10:00 AM 12/11/2023     2:00 PM 11/7/2023     2:00 PM 6/23/2023     7:00 AM   ADL ASSESSMENT   Feeding yourself No Help Needed No Help Needed No Help Needed No Help Needed No Help Needed   Getting from bed to chair No Help Needed No Help Needed No Help Needed No Help Needed No Help Needed   Getting dressed No Help Needed No Help Needed No Help Needed No Help Needed No Help Needed   Bathing or showering No Help Needed No Help Needed No Help Needed No Help Needed No Help Needed   Walk across the room (includes cane/walker) No Help Needed No Help Needed No Help Needed No Help

## 2024-06-18 RX ORDER — CEPHALEXIN 500 MG/1
500 CAPSULE ORAL 3 TIMES DAILY
Qty: 15 CAPSULE | Refills: 0 | Status: SHIPPED | OUTPATIENT
Start: 2024-06-18 | End: 2024-06-23

## 2024-06-21 ENCOUNTER — TELEPHONE (OUTPATIENT)
Age: 69
End: 2024-06-21

## 2024-06-21 DIAGNOSIS — R10.9 FLANK PAIN: ICD-10-CM

## 2024-06-21 DIAGNOSIS — R31.0 GROSS HEMATURIA: Primary | ICD-10-CM

## 2024-06-21 NOTE — TELEPHONE ENCOUNTER
Pt had gross hematuria. Still has sx after course abx, but no severe sx. Check CT urogram as outpatient, but if worsening fever, chills, or severe symptoms, see ER. Please inform pt.

## 2024-06-21 NOTE — TELEPHONE ENCOUNTER
[phone] contact # michael Delroy, calling to inform Dr. RAMOS that she still has pain & discomfort in lower bottom of stomach area.  Pt states she has enough meds for today (Fri. 06/21/2024 & Sat 06/22/2004) cephALEXin 500 MG cap  Dr. RAMOS mentioned that he wanted a CT Scan and wants to know the procedure for moving forward in getting that completed?    Thanks,

## 2024-06-27 ENCOUNTER — HOSPITAL ENCOUNTER (OUTPATIENT)
Facility: HOSPITAL | Age: 69
Discharge: HOME | End: 2024-06-27
Attending: FAMILY MEDICINE
Payer: MEDICARE

## 2024-06-27 DIAGNOSIS — R10.9 FLANK PAIN: ICD-10-CM

## 2024-06-27 DIAGNOSIS — R31.0 GROSS HEMATURIA: ICD-10-CM

## 2024-06-27 PROCEDURE — 74176 CT ABD & PELVIS W/O CONTRAST: CPT

## 2024-06-28 PROBLEM — K80.20 CALCULUS OF GALLBLADDER WITHOUT CHOLECYSTITIS WITHOUT OBSTRUCTION: Status: ACTIVE | Noted: 2024-06-28

## 2024-06-28 NOTE — RESULT ENCOUNTER NOTE
Bilat nephrolithiasis, non-obst, likely source of hematuria. No mass lesion. Incidentally shows gallstones, they don't seem to be causing problems at this time. LMTC.

## 2024-08-06 RX ORDER — QUETIAPINE FUMARATE 50 MG/1
50 TABLET, FILM COATED ORAL NIGHTLY
Qty: 90 TABLET | Refills: 3 | Status: SHIPPED | OUTPATIENT
Start: 2024-08-06

## 2024-08-06 RX ORDER — VENLAFAXINE HYDROCHLORIDE 150 MG/1
CAPSULE, EXTENDED RELEASE ORAL
Qty: 90 CAPSULE | Refills: 3 | Status: SHIPPED | OUTPATIENT
Start: 2024-08-06

## 2024-09-30 ENCOUNTER — TELEPHONE (OUTPATIENT)
Age: 69
End: 2024-09-30

## 2024-09-30 DIAGNOSIS — F33.2 SEVERE EPISODE OF RECURRENT MAJOR DEPRESSIVE DISORDER, WITHOUT PSYCHOTIC FEATURES (HCC): Primary | ICD-10-CM

## 2024-09-30 RX ORDER — ARIPIPRAZOLE 2 MG/1
2 TABLET ORAL DAILY
Qty: 30 TABLET | Refills: 11 | Status: SHIPPED | OUTPATIENT
Start: 2024-09-30

## 2024-09-30 NOTE — TELEPHONE ENCOUNTER
Pt contacted clinic, feeling more testy, angry, and less \"herself.\" Has been gradually worsening over past 3 mo, now is getting quite bothersome. No SI/HI. On review of regimen, has run out of abilify, thought she could do without it, but turns out was helping more than she knew. Will RF that, and f/u with pt this week for more thorough assessment. Has info for emergency interventions PRN, and I notified Dr. Costa's ofc.

## 2024-10-02 ENCOUNTER — OFFICE VISIT (OUTPATIENT)
Age: 69
End: 2024-10-02
Payer: MEDICARE

## 2024-10-02 VITALS
OXYGEN SATURATION: 96 % | HEIGHT: 64 IN | BODY MASS INDEX: 33.7 KG/M2 | HEART RATE: 93 BPM | RESPIRATION RATE: 18 BRPM | SYSTOLIC BLOOD PRESSURE: 118 MMHG | WEIGHT: 197.4 LBS | TEMPERATURE: 97.2 F | DIASTOLIC BLOOD PRESSURE: 78 MMHG

## 2024-10-02 DIAGNOSIS — I10 HTN (HYPERTENSION), BENIGN: ICD-10-CM

## 2024-10-02 DIAGNOSIS — F33.2 SEVERE EPISODE OF RECURRENT MAJOR DEPRESSIVE DISORDER, WITHOUT PSYCHOTIC FEATURES (HCC): Primary | ICD-10-CM

## 2024-10-02 DIAGNOSIS — Z12.31 ENCOUNTER FOR SCREENING MAMMOGRAM FOR MALIGNANT NEOPLASM OF BREAST: ICD-10-CM

## 2024-10-02 DIAGNOSIS — Z23 NEEDS FLU SHOT: ICD-10-CM

## 2024-10-02 PROCEDURE — 3078F DIAST BP <80 MM HG: CPT | Performed by: FAMILY MEDICINE

## 2024-10-02 PROCEDURE — G0008 ADMIN INFLUENZA VIRUS VAC: HCPCS | Performed by: FAMILY MEDICINE

## 2024-10-02 PROCEDURE — 3074F SYST BP LT 130 MM HG: CPT | Performed by: FAMILY MEDICINE

## 2024-10-02 PROCEDURE — 90653 IIV ADJUVANT VACCINE IM: CPT | Performed by: FAMILY MEDICINE

## 2024-10-02 PROCEDURE — 1123F ACP DISCUSS/DSCN MKR DOCD: CPT | Performed by: FAMILY MEDICINE

## 2024-10-02 PROCEDURE — 99214 OFFICE O/P EST MOD 30 MIN: CPT | Performed by: FAMILY MEDICINE

## 2024-10-02 RX ORDER — HYDROXYZINE PAMOATE 25 MG/1
25 CAPSULE ORAL 2 TIMES DAILY PRN
Qty: 30 CAPSULE | Refills: 2 | Status: SHIPPED | OUTPATIENT
Start: 2024-10-02

## 2024-10-02 SDOH — ECONOMIC STABILITY: FOOD INSECURITY: WITHIN THE PAST 12 MONTHS, YOU WORRIED THAT YOUR FOOD WOULD RUN OUT BEFORE YOU GOT MONEY TO BUY MORE.: NEVER TRUE

## 2024-10-02 SDOH — ECONOMIC STABILITY: INCOME INSECURITY: HOW HARD IS IT FOR YOU TO PAY FOR THE VERY BASICS LIKE FOOD, HOUSING, MEDICAL CARE, AND HEATING?: NOT VERY HARD

## 2024-10-02 SDOH — ECONOMIC STABILITY: FOOD INSECURITY: WITHIN THE PAST 12 MONTHS, THE FOOD YOU BOUGHT JUST DIDN'T LAST AND YOU DIDN'T HAVE MONEY TO GET MORE.: NEVER TRUE

## 2024-10-02 ASSESSMENT — PATIENT HEALTH QUESTIONNAIRE - PHQ9
8. MOVING OR SPEAKING SO SLOWLY THAT OTHER PEOPLE COULD HAVE NOTICED. OR THE OPPOSITE, BEING SO FIGETY OR RESTLESS THAT YOU HAVE BEEN MOVING AROUND A LOT MORE THAN USUAL: NOT AT ALL
SUM OF ALL RESPONSES TO PHQ QUESTIONS 1-9: 7
SUM OF ALL RESPONSES TO PHQ QUESTIONS 1-9: 7
1. LITTLE INTEREST OR PLEASURE IN DOING THINGS: SEVERAL DAYS
9. THOUGHTS THAT YOU WOULD BE BETTER OFF DEAD, OR OF HURTING YOURSELF: NOT AT ALL
3. TROUBLE FALLING OR STAYING ASLEEP: NOT AT ALL
SUM OF ALL RESPONSES TO PHQ QUESTIONS 1-9: 7
SUM OF ALL RESPONSES TO PHQ9 QUESTIONS 1 & 2: 4
5. POOR APPETITE OR OVEREATING: NOT AT ALL
SUM OF ALL RESPONSES TO PHQ QUESTIONS 1-9: 7
7. TROUBLE CONCENTRATING ON THINGS, SUCH AS READING THE NEWSPAPER OR WATCHING TELEVISION: NOT AT ALL
6. FEELING BAD ABOUT YOURSELF - OR THAT YOU ARE A FAILURE OR HAVE LET YOURSELF OR YOUR FAMILY DOWN: NEARLY EVERY DAY
4. FEELING TIRED OR HAVING LITTLE ENERGY: NOT AT ALL

## 2024-10-02 NOTE — PROGRESS NOTES
Delroy Brand is a 69 y.o. female presenting for/with:    Chief Complaint   Patient presents with    Anxiety     \"Feels like she's losing touch with reality\"       Vitals:    10/02/24 0900   BP: 118/78   Pulse: 93   Resp: 18   Temp: 97.2 °F (36.2 °C)   TempSrc: Temporal   SpO2: 96%   Weight: 89.5 kg (197 lb 6.4 oz)   Height: 1.626 m (5' 4\")       Pain Scale: 10 - Worst pain ever/10  Pain Location: Groin    \"Have you been to the ER, urgent care clinic since your last visit?  Hospitalized since your last visit?\"    NO    “Have you seen or consulted any other health care providers outside of [facility] since your last visit?”    NO       Have you had a mammogram?”   NO    Date of last Mammogram: 6/6/2022               10/2/2024     9:04 AM   PHQ-9    Little interest or pleasure in doing things 1   Feeling down, depressed, or hopeless 3   Trouble falling or staying asleep, or sleeping too much 0   Feeling tired or having little energy 0   Poor appetite or overeating 0   Feeling bad about yourself - or that you are a failure or have let yourself or your family down 3   Trouble concentrating on things, such as reading the newspaper or watching television 0   Moving or speaking so slowly that other people could have noticed. Or the opposite - being so fidgety or restless that you have been moving around a lot more than usual 0   Thoughts that you would be better off dead, or of hurting yourself in some way 0   PHQ-2 Score 4   PHQ-9 Total Score 7           6/23/2023     7:30 AM 11/22/2021    12:00 AM 6/29/2021    12:00 AM 6/8/2021    12:00 AM   [facility] AMB LEARNING ASSESSMENT   Primary Learner Patient Patient Patient Patient   Primary Language ENGLISH ENGLISH ENGLISH ENGLISH   Learning Preference READING READING READING READING   Answered By self patient patient patient   Relationship to Learner SELF SELF SELF SELF            10/2/2024     9:01 AM   Amb Fall Risk Assessment and TUG Test   Do you feel unsteady 
for Anxiety    lisinopril-hydroCHLOROthiazide (PRINZIDE;ZESTORETIC) 10-12.5 MG per tablet     magnesium oxide (MAG-OX) 400 MG tablet Take 1 tablet by mouth daily    propranolol (INDERAL) 40 MG tablet Take 1 tablet by mouth 2 times daily as needed (headache or palpitations)     No current facility-administered medications for this visit.     ROS:   General: No fever, chills, or abnormal weight loss  Respiratory: No cough, dyspnea  CV: No chest pain, palpitations    Objective:  Vitals:    10/02/24 0900   BP: 118/78   Pulse: 93   Resp: 18   Temp: 97.2 °F (36.2 °C)   SpO2: 96%     Wt Readings from Last 3 Encounters:   10/02/24 89.5 kg (197 lb 6.4 oz)   06/23/23 88.3 kg (194 lb 9.6 oz)   04/03/23 88.8 kg (195 lb 12.8 oz)     BP Readings from Last 3 Encounters:   10/02/24 118/78   06/23/23 92/68   04/03/23 116/88     Physical Examination: General appearance - alert, well appearing, and in no distress  Mental status - alert, oriented to person, place, and time  Eyes - pupils equal and reactive, extraocular eye movements intact. Pupillary reflex normal bilat, optic discs sharp bilat with normal venous pulsations.  ENT - bilateral external ears and nose normal. Normal lips  Neck - supple, no significant adenopathy, no thyromegaly or mass  Extremities - peripheral pulses normal, no pedal edema, no clubbing or cyanosis. Bilat hips w/pain with ROM both internal and ext rotation, elicits index pain, and POS TTP to anterior joint capsule.     A/p:  Anxiety and Depression  Doing a little better back on abilify. RF vistaril for PRN use    DJD hip Bilat  Work on activity mod, wt loss, and ok to con't OTC NSAIDs as directed as tolerated. Watch for GERD type sx, d/c in that case. Good candidate for GLP1, but can't afford. Consider start if more affordable in future.      HCM:  Flu shot today  Prevnar 20 planned this fall, Covid, RSV, and shingrix due this fall    Follow up 3mo/ PRN labs/ sx.

## 2024-10-02 NOTE — PATIENT INSTRUCTIONS
RSV vaccine and new COVID omicron vaccines due at your pharmacy. Please get when available, can help prevent severe lung disease.  Prevnar 20 for pneumonia, Shingrix, and TdAP for tetanus also on your list, get those when you can.

## 2024-10-15 ENCOUNTER — OFFICE VISIT (OUTPATIENT)
Age: 69
End: 2024-10-15
Payer: MEDICARE

## 2024-10-15 VITALS — HEART RATE: 90 BPM | TEMPERATURE: 97.5 F | RESPIRATION RATE: 18 BRPM | OXYGEN SATURATION: 95 %

## 2024-10-15 DIAGNOSIS — R05.9 COUGH, UNSPECIFIED TYPE: ICD-10-CM

## 2024-10-15 DIAGNOSIS — J45.21 MILD INTERMITTENT ASTHMATIC BRONCHITIS WITH ACUTE EXACERBATION: Primary | ICD-10-CM

## 2024-10-15 DIAGNOSIS — R06.2 WHEEZING: ICD-10-CM

## 2024-10-15 PROCEDURE — 1123F ACP DISCUSS/DSCN MKR DOCD: CPT | Performed by: FAMILY MEDICINE

## 2024-10-15 PROCEDURE — 87635 SARS-COV-2 COVID-19 AMP PRB: CPT | Performed by: FAMILY MEDICINE

## 2024-10-15 PROCEDURE — 99213 OFFICE O/P EST LOW 20 MIN: CPT | Performed by: FAMILY MEDICINE

## 2024-10-15 RX ORDER — ALBUTEROL SULFATE 90 UG/1
2 INHALANT RESPIRATORY (INHALATION) 4 TIMES DAILY PRN
Qty: 18 G | Refills: 5 | Status: SHIPPED | OUTPATIENT
Start: 2024-10-15

## 2024-10-15 RX ORDER — METHYLPREDNISOLONE 4 MG
TABLET, DOSE PACK ORAL
Qty: 1 KIT | Refills: 0 | Status: SHIPPED | OUTPATIENT
Start: 2024-10-15 | End: 2024-10-21

## 2024-10-15 RX ORDER — BUDESONIDE AND FORMOTEROL FUMARATE DIHYDRATE 160; 4.5 UG/1; UG/1
1-2 AEROSOL RESPIRATORY (INHALATION) 2 TIMES DAILY
Qty: 10.2 G | Refills: 3 | Status: SHIPPED | OUTPATIENT
Start: 2024-10-15

## 2024-10-15 ASSESSMENT — PATIENT HEALTH QUESTIONNAIRE - PHQ9
4. FEELING TIRED OR HAVING LITTLE ENERGY: NOT AT ALL
6. FEELING BAD ABOUT YOURSELF - OR THAT YOU ARE A FAILURE OR HAVE LET YOURSELF OR YOUR FAMILY DOWN: NOT AT ALL
2. FEELING DOWN, DEPRESSED OR HOPELESS: NOT AT ALL
SUM OF ALL RESPONSES TO PHQ QUESTIONS 1-9: 0
SUM OF ALL RESPONSES TO PHQ QUESTIONS 1-9: 0
3. TROUBLE FALLING OR STAYING ASLEEP: NOT AT ALL
10. IF YOU CHECKED OFF ANY PROBLEMS, HOW DIFFICULT HAVE THESE PROBLEMS MADE IT FOR YOU TO DO YOUR WORK, TAKE CARE OF THINGS AT HOME, OR GET ALONG WITH OTHER PEOPLE: NOT DIFFICULT AT ALL
SUM OF ALL RESPONSES TO PHQ QUESTIONS 1-9: 0
SUM OF ALL RESPONSES TO PHQ9 QUESTIONS 1 & 2: 0
9. THOUGHTS THAT YOU WOULD BE BETTER OFF DEAD, OR OF HURTING YOURSELF: NOT AT ALL
5. POOR APPETITE OR OVEREATING: NOT AT ALL
SUM OF ALL RESPONSES TO PHQ QUESTIONS 1-9: 0
1. LITTLE INTEREST OR PLEASURE IN DOING THINGS: NOT AT ALL
7. TROUBLE CONCENTRATING ON THINGS, SUCH AS READING THE NEWSPAPER OR WATCHING TELEVISION: NOT AT ALL
8. MOVING OR SPEAKING SO SLOWLY THAT OTHER PEOPLE COULD HAVE NOTICED. OR THE OPPOSITE, BEING SO FIGETY OR RESTLESS THAT YOU HAVE BEEN MOVING AROUND A LOT MORE THAN USUAL: NOT AT ALL

## 2024-10-15 NOTE — PROGRESS NOTES
Delroy Brand is a 69 y.o. female presenting for/with:    Chief Complaint   Patient presents with    Cough     Pt reports having a cough since last Saturday. Pt reports wheezing and having SOB. Pt denies having a fever or sore throat.       Vitals:    10/15/24 1151   Pulse: 90   Resp: 18   Temp: 97.5 °F (36.4 °C)   TempSrc: Temporal   SpO2: 95%       Pain Scale: 0 - No pain/10  Pain Location:     \"Have you been to the ER, urgent care clinic since your last visit?  Hospitalized since your last visit?\"    NO    “Have you seen or consulted any other health care providers outside of [facility] since your last visit?”    NO                 10/15/2024    11:49 AM   PHQ-9    Little interest or pleasure in doing things 0   Feeling down, depressed, or hopeless 0   Trouble falling or staying asleep, or sleeping too much 0   Feeling tired or having little energy 0   Poor appetite or overeating 0   Feeling bad about yourself - or that you are a failure or have let yourself or your family down 0   Trouble concentrating on things, such as reading the newspaper or watching television 0   Moving or speaking so slowly that other people could have noticed. Or the opposite - being so fidgety or restless that you have been moving around a lot more than usual 0   Thoughts that you would be better off dead, or of hurting yourself in some way 0   PHQ-2 Score 0   PHQ-9 Total Score 0   If you checked off any problems, how difficult have these problems made it for you to do your work, take care of things at home, or get along with other people? 0           10/15/2024    11:50 AM 6/23/2023     7:30 AM 11/22/2021    12:00 AM 6/29/2021    12:00 AM 6/8/2021    12:00 AM   [facility] AMB LEARNING ASSESSMENT   Primary Learner Patient Patient Patient Patient Patient   Primary Language ENGLISH ENGLISH ENGLISH ENGLISH ENGLISH   Learning Preference DEMONSTRATION READING READING READING READING   Answered By patient self patient patient

## 2024-10-15 NOTE — PROGRESS NOTES
Delroy Brand is a 69 y.o. female  Chief Complaint   Patient presents with    Cough     Pt reports having a cough since last Saturday. Pt reports wheezing and having SOB. Pt denies having a fever or sore throat.     [facility]    HPI:  Symptoms include cough, chest congestion, gradually worsening over past 4 days. No f/c, shadia po food fluid, but coughing a lot. No dyspnea other than during coughing spells.  Evaluation to date: seen last year for similar sx, gets every year around this time. Treatment to date: albuterol, helps some.    Reviewed PMH, PSH, SH, Medications, allergies (see chart).  Current Outpatient Medications   Medication Sig    albuterol sulfate HFA (VENTOLIN HFA) 108 (90 Base) MCG/ACT inhaler Inhale 2 puffs into the lungs 4 times daily as needed for Wheezing    budesonide-formoterol (SYMBICORT) 160-4.5 MCG/ACT AERO Inhale 1-2 puffs into the lungs 2 times daily Indications: prevent bronchitis flareup    methylPREDNISolone (MEDROL DOSEPACK) 4 MG tablet Take by mouth.    hydrOXYzine pamoate (VISTARIL) 25 MG capsule Take 1 capsule by mouth 2 times daily as needed for Anxiety    ARIPiprazole (ABILIFY) 2 MG tablet Take 1 tablet by mouth daily    QUEtiapine (SEROQUEL) 50 MG tablet TAKE 1 TABLET BY MOUTH EVERY NIGHT    venlafaxine (EFFEXOR XR) 150 MG extended release capsule TAKE 1 CAPSULE BY MOUTH DAILY FOR NERVES    Wheat Dextrin (BENEFIBER) POWD Take 4 g by mouth every evening Indications: stabilize bowel function    lisinopril-hydroCHLOROthiazide (PRINZIDE;ZESTORETIC) 10-12.5 MG per tablet     magnesium oxide (MAG-OX) 400 MG tablet Take 1 tablet by mouth daily    propranolol (INDERAL) 40 MG tablet Take 1 tablet by mouth 2 times daily as needed (headache or palpitations)     No current facility-administered medications for this visit.     ROS:   General: No fever, chills, or abnormal weight loss  Respiratory: No cough, dyspnea  CV: No chest pain, palpitations    Objective:  Visit

## 2024-10-21 ENCOUNTER — HOSPITAL ENCOUNTER (EMERGENCY)
Facility: HOSPITAL | Age: 69
Discharge: HOME | End: 2024-10-21
Attending: FAMILY MEDICINE | Admitting: FAMILY MEDICINE
Payer: MEDICARE

## 2024-10-21 ENCOUNTER — APPOINTMENT (OUTPATIENT)
Facility: HOSPITAL | Age: 69
End: 2024-10-21
Payer: MEDICARE

## 2024-10-21 VITALS
WEIGHT: 196 LBS | BODY MASS INDEX: 33.64 KG/M2 | HEART RATE: 106 BPM | OXYGEN SATURATION: 95 % | SYSTOLIC BLOOD PRESSURE: 153 MMHG | TEMPERATURE: 98.7 F | RESPIRATION RATE: 18 BRPM | DIASTOLIC BLOOD PRESSURE: 74 MMHG

## 2024-10-21 DIAGNOSIS — J45.21 MILD INTERMITTENT ASTHMA WITH EXACERBATION: Primary | ICD-10-CM

## 2024-10-21 LAB
ALBUMIN SERPL-MCNC: 4.1 G/DL (ref 3.5–5)
ALBUMIN/GLOB SERPL: 1.2 (ref 1.1–2.2)
ALP SERPL-CCNC: 128 U/L (ref 45–117)
ALT SERPL-CCNC: 28 U/L (ref 12–78)
ANION GAP SERPL CALC-SCNC: 9 MMOL/L (ref 2–12)
AST SERPL-CCNC: 18 U/L (ref 15–37)
BASOPHILS # BLD: 0 K/UL (ref 0–0.1)
BASOPHILS NFR BLD: 0 % (ref 0–1)
BILIRUB SERPL-MCNC: 0.3 MG/DL (ref 0.2–1)
BUN SERPL-MCNC: 21 MG/DL (ref 6–20)
BUN/CREAT SERPL: 27 (ref 12–20)
CALCIUM SERPL-MCNC: 9.3 MG/DL (ref 8.5–10.1)
CHLORIDE SERPL-SCNC: 102 MMOL/L (ref 97–108)
CO2 SERPL-SCNC: 30 MMOL/L (ref 21–32)
CREAT SERPL-MCNC: 0.78 MG/DL (ref 0.55–1.02)
DIFFERENTIAL METHOD BLD: ABNORMAL
EOSINOPHIL # BLD: 0 K/UL (ref 0–0.4)
EOSINOPHIL NFR BLD: 0 % (ref 0–7)
ERYTHROCYTE [DISTWIDTH] IN BLOOD BY AUTOMATED COUNT: 13.9 % (ref 11.5–14.5)
FLUAV RNA SPEC QL NAA+PROBE: NOT DETECTED
FLUBV RNA SPEC QL NAA+PROBE: NOT DETECTED
GLOBULIN SER CALC-MCNC: 3.4 G/DL (ref 2–4)
GLUCOSE SERPL-MCNC: 100 MG/DL (ref 65–100)
HCT VFR BLD AUTO: 45.1 % (ref 35–47)
HGB BLD-MCNC: 14.7 G/DL (ref 11.5–16)
IMM GRANULOCYTES # BLD AUTO: 0.1 K/UL (ref 0–0.04)
IMM GRANULOCYTES NFR BLD AUTO: 0 % (ref 0–0.5)
LYMPHOCYTES # BLD: 1.1 K/UL (ref 0.8–3.5)
LYMPHOCYTES NFR BLD: 8 % (ref 12–49)
MAGNESIUM SERPL-MCNC: 2.2 MG/DL (ref 1.6–2.4)
MCH RBC QN AUTO: 29 PG (ref 26–34)
MCHC RBC AUTO-ENTMCNC: 32.6 G/DL (ref 30–36.5)
MCV RBC AUTO: 89 FL (ref 80–99)
MONOCYTES # BLD: 0.6 K/UL (ref 0–1)
MONOCYTES NFR BLD: 4 % (ref 5–13)
NEUTS SEG # BLD: 11.5 K/UL (ref 1.8–8)
NEUTS SEG NFR BLD: 87 % (ref 32–75)
NRBC # BLD: 0 K/UL (ref 0–0.01)
NRBC BLD-RTO: 0 PER 100 WBC
PLATELET # BLD AUTO: 356 K/UL (ref 150–400)
PMV BLD AUTO: 10 FL (ref 8.9–12.9)
POTASSIUM SERPL-SCNC: 3.9 MMOL/L (ref 3.5–5.1)
PROT SERPL-MCNC: 7.5 G/DL (ref 6.4–8.2)
RBC # BLD AUTO: 5.07 M/UL (ref 3.8–5.2)
S PYO DNA THROAT QL NAA+PROBE: NOT DETECTED
SARS-COV-2 RNA RESP QL NAA+PROBE: NOT DETECTED
SODIUM SERPL-SCNC: 141 MMOL/L (ref 136–145)
SOURCE: NORMAL
WBC # BLD AUTO: 13.3 K/UL (ref 3.6–11)

## 2024-10-21 PROCEDURE — 83735 ASSAY OF MAGNESIUM: CPT

## 2024-10-21 PROCEDURE — 6370000000 HC RX 637 (ALT 250 FOR IP): Performed by: FAMILY MEDICINE

## 2024-10-21 PROCEDURE — 94640 AIRWAY INHALATION TREATMENT: CPT

## 2024-10-21 PROCEDURE — 85025 COMPLETE CBC W/AUTO DIFF WBC: CPT

## 2024-10-21 PROCEDURE — 87636 SARSCOV2 & INF A&B AMP PRB: CPT

## 2024-10-21 PROCEDURE — 96374 THER/PROPH/DIAG INJ IV PUSH: CPT

## 2024-10-21 PROCEDURE — 80053 COMPREHEN METABOLIC PANEL: CPT

## 2024-10-21 PROCEDURE — 99284 EMERGENCY DEPT VISIT MOD MDM: CPT

## 2024-10-21 PROCEDURE — 2580000003 HC RX 258: Performed by: FAMILY MEDICINE

## 2024-10-21 PROCEDURE — 87651 STREP A DNA AMP PROBE: CPT

## 2024-10-21 PROCEDURE — 36415 COLL VENOUS BLD VENIPUNCTURE: CPT

## 2024-10-21 PROCEDURE — 71045 X-RAY EXAM CHEST 1 VIEW: CPT

## 2024-10-21 PROCEDURE — 6360000002 HC RX W HCPCS: Performed by: FAMILY MEDICINE

## 2024-10-21 RX ORDER — IPRATROPIUM BROMIDE AND ALBUTEROL SULFATE 2.5; .5 MG/3ML; MG/3ML
1 SOLUTION RESPIRATORY (INHALATION)
Status: COMPLETED | OUTPATIENT
Start: 2024-10-21 | End: 2024-10-21

## 2024-10-21 RX ORDER — BENZONATATE 100 MG/1
100 CAPSULE ORAL
Status: COMPLETED | OUTPATIENT
Start: 2024-10-21 | End: 2024-10-21

## 2024-10-21 RX ORDER — PREDNISONE 10 MG/1
TABLET ORAL
Qty: 30 TABLET | Refills: 0 | Status: SHIPPED | OUTPATIENT
Start: 2024-10-21 | End: 2024-11-02

## 2024-10-21 RX ADMIN — IPRATROPIUM BROMIDE AND ALBUTEROL SULFATE 1 DOSE: .5; 2.5 SOLUTION RESPIRATORY (INHALATION) at 19:29

## 2024-10-21 RX ADMIN — BENZONATATE 100 MG: 100 CAPSULE ORAL at 17:54

## 2024-10-21 RX ADMIN — IPRATROPIUM BROMIDE AND ALBUTEROL SULFATE 1 DOSE: .5; 2.5 SOLUTION RESPIRATORY (INHALATION) at 17:09

## 2024-10-21 RX ADMIN — WATER 125 MG: 1 INJECTION INTRAMUSCULAR; INTRAVENOUS; SUBCUTANEOUS at 17:54

## 2024-10-21 RX ADMIN — IPRATROPIUM BROMIDE AND ALBUTEROL SULFATE 1 DOSE: .5; 2.5 SOLUTION RESPIRATORY (INHALATION) at 17:42

## 2024-10-21 ASSESSMENT — PAIN SCALES - GENERAL: PAINLEVEL_OUTOF10: 0

## 2024-10-21 ASSESSMENT — PAIN - FUNCTIONAL ASSESSMENT
PAIN_FUNCTIONAL_ASSESSMENT: NONE - DENIES PAIN
PAIN_FUNCTIONAL_ASSESSMENT: 0-10

## 2024-10-21 NOTE — ED PROVIDER NOTES
[facility] EMERGENCY DEP  EMERGENCY DEPARTMENT ENCOUNTER       Pt Name: Delroy Brand  MRN: [id number]  Birthdate 1955  Date of evaluation: 10/21/2024  Provider: Larissa Franco MD   PCP: Curtis Huff MD  Note Started: 5:42 PM EDT 10/21/24     CHIEF COMPLAINT       Chief Complaint   Patient presents with    Wheezing        HISTORY OF PRESENT ILLNESS: 1 or more elements      History From: Patient  HPI Limitations: None     Delroy Brand is a 69 y.o. female with a history of mild intermittent asthma who presents to the emergency department with wheezing and a deep cough that she has had for the last 10 days.  She saw her primary care physician almost a week ago, and he gave her a Medrol Dosepak and an albuterol inhaler.  She has also been doing albuterol nebulizers at home several times a day.  Today, she does not seem to be any better, and this is the last day of her steroids.        Nursing Notes were all reviewed and agreed with or any disagreements were addressed in the HPI.     REVIEW OF SYSTEMS      Review of Systems     Positives and Pertinent negatives as per HPI.    PAST HISTORY     Past Medical History:  Past Medical History:   Diagnosis Date    Autoimmune disease (HCC)     Autoimmune hepatitis (HCC)     Calculus of kidney     Menopause          Past Surgical History:  Past Surgical History:   Procedure Laterality Date    HYSTERECTOMY (CERVIX STATUS UNKNOWN)      one ovary left    ORTHOPEDIC SURGERY      OVARY REMOVAL         Family History:  History reviewed. No pertinent family history.    Social History:  Social History     Tobacco Use    Smoking status: Never    Smokeless tobacco: Never   Substance Use Topics    Alcohol use: No    Drug use: No       Allergies:  Allergies   Allergen Reactions    Hydrocodone Itching and Rash    Morphine Rash       CURRENT MEDICATIONS      Discharge Medication List as of 10/21/2024  8:02 PM        CONTINUE these medications which have NOT CHANGED    Details

## 2024-10-21 NOTE — ED TRIAGE NOTES
Seen by PCP x 1.5 weeks ago and oput on inhalers and medrol dose packs with no relief . Audible wheezing , _ + harsh cough , afebrile

## 2024-10-22 NOTE — DISCHARGE INSTRUCTIONS
--Prednisone taper: 3 days at each daily dosage of 40, 30, 20, and 10 mg. If the side effects are more troublesome than the benefit of the breathing improvement, then cut back the dosage a bit.  --Albuterol inhaler or neb every 4 hours as needed.  --Follow up with Dr. Huff in 2 days.  --Return to the ED if you are worsening at all.

## 2024-10-22 NOTE — ED NOTES
2113- patient discharged by MD, unknown if patient had IV removed prior to discharge. This nurse attempted to call the patient at least 3 times without an answer. Nursing supervisor made aware.

## 2024-10-22 NOTE — ED NOTES
At 2154, attempted to call pt at contact phone number on chart. No answer. This writer then call the number for the Emergency contact on chart for [facility] Mirta [phone]. She stated that Ms. Delroy Brand went to her daughter-in-law (Maryjane Brand), whom is a nurse. And she removed the IV site. ED charge nurse, MARIA DEL ROSARIO Miller made aware.

## 2025-01-15 ENCOUNTER — OFFICE VISIT (OUTPATIENT)
Age: 70
End: 2025-01-15

## 2025-01-15 VITALS
OXYGEN SATURATION: 97 % | WEIGHT: 201 LBS | TEMPERATURE: 98.2 F | HEART RATE: 88 BPM | HEIGHT: 64 IN | RESPIRATION RATE: 20 BRPM | DIASTOLIC BLOOD PRESSURE: 74 MMHG | BODY MASS INDEX: 34.31 KG/M2 | SYSTOLIC BLOOD PRESSURE: 106 MMHG

## 2025-01-15 DIAGNOSIS — E78.2 MIXED HYPERLIPIDEMIA: ICD-10-CM

## 2025-01-15 DIAGNOSIS — F33.2 SEVERE EPISODE OF RECURRENT MAJOR DEPRESSIVE DISORDER, WITHOUT PSYCHOTIC FEATURES (HCC): ICD-10-CM

## 2025-01-15 DIAGNOSIS — Z23 ENCOUNTER FOR IMMUNIZATION: ICD-10-CM

## 2025-01-15 DIAGNOSIS — Z00.00 MEDICARE ANNUAL WELLNESS VISIT, SUBSEQUENT: Primary | ICD-10-CM

## 2025-01-15 DIAGNOSIS — I10 HTN (HYPERTENSION), BENIGN: ICD-10-CM

## 2025-01-15 RX ORDER — LISINOPRIL AND HYDROCHLOROTHIAZIDE 10; 12.5 MG/1; MG/1
1 TABLET ORAL DAILY
Qty: 90 TABLET | Refills: 3 | Status: SHIPPED | OUTPATIENT
Start: 2025-03-01

## 2025-01-15 RX ORDER — ARIPIPRAZOLE 2 MG/1
2 TABLET ORAL DAILY
Qty: 90 TABLET | Refills: 3 | Status: SHIPPED | OUTPATIENT
Start: 2025-01-15

## 2025-01-15 SDOH — ECONOMIC STABILITY: FOOD INSECURITY: WITHIN THE PAST 12 MONTHS, THE FOOD YOU BOUGHT JUST DIDN'T LAST AND YOU DIDN'T HAVE MONEY TO GET MORE.: NEVER TRUE

## 2025-01-15 SDOH — ECONOMIC STABILITY: FOOD INSECURITY: WITHIN THE PAST 12 MONTHS, YOU WORRIED THAT YOUR FOOD WOULD RUN OUT BEFORE YOU GOT MONEY TO BUY MORE.: NEVER TRUE

## 2025-01-15 ASSESSMENT — PATIENT HEALTH QUESTIONNAIRE - PHQ9
3. TROUBLE FALLING OR STAYING ASLEEP: NOT AT ALL
10. IF YOU CHECKED OFF ANY PROBLEMS, HOW DIFFICULT HAVE THESE PROBLEMS MADE IT FOR YOU TO DO YOUR WORK, TAKE CARE OF THINGS AT HOME, OR GET ALONG WITH OTHER PEOPLE: NOT DIFFICULT AT ALL
2. FEELING DOWN, DEPRESSED OR HOPELESS: NOT AT ALL
SUM OF ALL RESPONSES TO PHQ QUESTIONS 1-9: 0
8. MOVING OR SPEAKING SO SLOWLY THAT OTHER PEOPLE COULD HAVE NOTICED. OR THE OPPOSITE, BEING SO FIGETY OR RESTLESS THAT YOU HAVE BEEN MOVING AROUND A LOT MORE THAN USUAL: NOT AT ALL
7. TROUBLE CONCENTRATING ON THINGS, SUCH AS READING THE NEWSPAPER OR WATCHING TELEVISION: NOT AT ALL
SUM OF ALL RESPONSES TO PHQ QUESTIONS 1-9: 0
SUM OF ALL RESPONSES TO PHQ9 QUESTIONS 1 & 2: 0
SUM OF ALL RESPONSES TO PHQ QUESTIONS 1-9: 0
4. FEELING TIRED OR HAVING LITTLE ENERGY: NOT AT ALL
SUM OF ALL RESPONSES TO PHQ QUESTIONS 1-9: 0
9. THOUGHTS THAT YOU WOULD BE BETTER OFF DEAD, OR OF HURTING YOURSELF: NOT AT ALL
5. POOR APPETITE OR OVEREATING: NOT AT ALL
1. LITTLE INTEREST OR PLEASURE IN DOING THINGS: NOT AT ALL
6. FEELING BAD ABOUT YOURSELF - OR THAT YOU ARE A FAILURE OR HAVE LET YOURSELF OR YOUR FAMILY DOWN: NOT AT ALL

## 2025-01-15 NOTE — PATIENT INSTRUCTIONS
Covid and shingrix due this spring, please get at your pharmacy when you can.       Learning About Being Active as an Older Adult  Why is being active important as you get older?     Being active is one of the best things you can do for your health. And it's never too late to start. Being active--or getting active, if you aren't already--has definite benefits. It can:  Give you more energy,  Keep your mind sharp.  Improve balance to reduce your risk of falls.  Help you manage chronic illness with fewer medicines.  No matter how old you are, how fit you are, or what health problems you have, there is a form of activity that will work for you. And the more physical activity you can do, the better your overall health will be.  What kinds of activity can help you stay healthy?  Being more active will make your daily activities easier. Physical activity includes planned exercise and things you do in daily life. There are four types of activity:  Aerobic.  Doing aerobic activity makes your heart and lungs strong.  Includes walking, dancing, and gardening.  Aim for at least 2½ hours spread throughout the week.  It improves your energy and can help you sleep better.  Muscle-strengthening.  This type of activity can help maintain muscle and strengthen bones.  Includes climbing stairs, using resistance bands, and lifting or carrying heavy loads.  Aim for at least twice a week.  It can help protect the knees and other joints.  Stretching.  Stretching gives you better range of motion in joints and muscles.  Includes upper arm stretches, calf stretches, and gentle yoga.  Aim for at least twice a week, preferably after your muscles are warmed up from other activities.  It can help you function better in daily life.  Balancing.  This helps you stay coordinated and have good posture.  Includes heel-to-toe walking, meghan chi, and certain types of yoga.  Aim for at least 3 days a week.  It can reduce your risk of falling.  Even if you

## 2025-01-15 NOTE — PROGRESS NOTES
Delroy Brand is a 69 y.o. female presenting for/with:    Chief Complaint   Patient presents with    Medicare AWV    Immunizations     VIIS Verified    Other     Pt reports having a stabbing pain in her groin that she would like to discuss with PCP.       Vitals:    01/15/25 1342   BP: 106/74   Site: Left Upper Arm   Position: Sitting   Cuff Size: Medium Adult   Pulse: 88   Resp: 20   Temp: 98.2 °F (36.8 °C)   TempSrc: Temporal   SpO2: 97%   Weight: 91.2 kg (201 lb)   Height: 1.626 m (5' 4\")       Pain Scale: 0 - No pain/10  Pain Location:     \"Have you been to the ER, urgent care clinic since your last visit?  Hospitalized since your last visit?\"    NO    “Have you seen or consulted any other health care providers outside of [facility] since your last visit?”    NO       Have you had a mammogram?”   NO    Date of last Mammogram: 6/6/2022               1/15/2025     1:45 PM   PHQ-9    Trouble falling or staying asleep, or sleeping too much 0   Feeling tired or having little energy 0   Poor appetite or overeating 0   Feeling bad about yourself - or that you are a failure or have let yourself or your family down 0   Trouble concentrating on things, such as reading the newspaper or watching television 0   Moving or speaking so slowly that other people could have noticed. Or the opposite - being so fidgety or restless that you have been moving around a lot more than usual 0   Thoughts that you would be better off dead, or of hurting yourself in some way 0   PHQ-9 Total Score 0   If you checked off any problems, how difficult have these problems made it for you to do your work, take care of things at home, or get along with other people? 0           1/15/2025     1:30 PM 10/15/2024    11:50 AM 6/23/2023     7:30 AM 11/22/2021    12:00 AM 6/29/2021    12:00 AM 6/8/2021    12:00 AM   [facility] AMB LEARNING ASSESSMENT   Primary Learner Patient Patient Patient Patient Patient Patient   Primary Language ENGLISH

## 2025-01-15 NOTE — PROGRESS NOTES
Delroy Brand is a 69 y.o. female  Chief Complaint   Patient presents with    Medicare AWV    Immunizations     VIIS Verified    Other     Pt reports having a stabbing pain in her groin that she would like to discuss with PCP.     HPI:  Mood changes  Hasn't seen psych lately. Was doing ok, but over past couple mo has been feeling more anxious. Current regimen- abilify 2mg qam (but ran out earlier this year), seroquel 50mg qhs (taking), effexor XR 150mg qd (taking), and used to take atarax 25mg BID PRN (but has run out). Occ and inderal 40g BID PRN palpitations and panic (has).     Hypertension and occ palpitations  Blood pressures good today. Current regimen: ACE inhibitor and thiazide diuretic, with PRN doses of low dose b-blocker. Patient denies chest pain, peripheral edema. EKG's have been reassuring. TFT's ok summer 2023.  Lab review:   Lab Results   Component Value Date     10/21/2024    K 3.9 10/21/2024     10/21/2024    CO2 30 10/21/2024    GLUCOSE 100 10/21/2024    BUN 21 (H) 10/21/2024    CREATININE 0.78 10/21/2024     Lab Results   Component Value Date    TSH 1.17 06/23/2023     Hip pain, bilat  No activity changes or acute trauma or fall. Staying about the same since last visit. Naproxen helps, shadia well. Worse with standing and walking, better with rest.    Reviewed PMH, PSH, SH, Medications, allergies (see chart). No fhx thyroid disease/ca  Current Outpatient Medications   Medication Sig    albuterol sulfate HFA (VENTOLIN HFA) 108 (90 Base) MCG/ACT inhaler Inhale 2 puffs into the lungs 4 times daily as needed for Wheezing    budesonide-formoterol (SYMBICORT) 160-4.5 MCG/ACT AERO Inhale 1-2 puffs into the lungs 2 times daily Indications: prevent bronchitis flareup    hydrOXYzine pamoate (VISTARIL) 25 MG capsule Take 1 capsule by mouth 2 times daily as needed for Anxiety    ARIPiprazole (ABILIFY) 2 MG tablet Take 1 tablet by mouth daily    QUEtiapine (SEROQUEL) 50 MG tablet TAKE 1 TABLET BY

## 2025-07-14 RX ORDER — QUETIAPINE FUMARATE 50 MG/1
50 TABLET, FILM COATED ORAL NIGHTLY
Qty: 90 TABLET | Refills: 2 | Status: SHIPPED | OUTPATIENT
Start: 2025-07-14

## 2025-08-07 RX ORDER — VENLAFAXINE HYDROCHLORIDE 150 MG/1
150 CAPSULE, EXTENDED RELEASE ORAL DAILY
Qty: 90 CAPSULE | Refills: 1 | Status: SHIPPED | OUTPATIENT
Start: 2025-08-07

## 2025-08-07 RX ORDER — QUETIAPINE FUMARATE 50 MG/1
50 TABLET, FILM COATED ORAL NIGHTLY
Qty: 90 TABLET | Refills: 1 | Status: SHIPPED | OUTPATIENT
Start: 2025-08-07

## 2025-09-01 ENCOUNTER — HOSPITAL ENCOUNTER (EMERGENCY)
Facility: HOSPITAL | Age: 70
Discharge: HOME | End: 2025-09-01
Attending: EMERGENCY MEDICINE
Payer: MEDICARE

## 2025-09-01 VITALS
HEART RATE: 85 BPM | OXYGEN SATURATION: 98 % | WEIGHT: 200 LBS | BODY MASS INDEX: 34.15 KG/M2 | TEMPERATURE: 98.7 F | HEIGHT: 64 IN | RESPIRATION RATE: 18 BRPM | SYSTOLIC BLOOD PRESSURE: 135 MMHG | DIASTOLIC BLOOD PRESSURE: 89 MMHG

## 2025-09-01 DIAGNOSIS — F43.20 GRIEF REACTION: Primary | ICD-10-CM

## 2025-09-01 LAB
ALBUMIN SERPL-MCNC: 4 G/DL (ref 3.5–5)
ALBUMIN/GLOB SERPL: 1.2 (ref 1.1–2.2)
ALP SERPL-CCNC: 130 U/L (ref 45–117)
ALT SERPL-CCNC: 19 U/L (ref 12–78)
ANION GAP SERPL CALC-SCNC: 15 MMOL/L (ref 2–12)
AST SERPL-CCNC: 17 U/L (ref 15–37)
BASOPHILS # BLD: 0.05 K/UL (ref 0–0.1)
BASOPHILS NFR BLD: 0.5 % (ref 0–1)
BILIRUB SERPL-MCNC: 0.5 MG/DL (ref 0.2–1)
BUN SERPL-MCNC: 14 MG/DL (ref 6–20)
BUN/CREAT SERPL: 17 (ref 12–20)
CALCIUM SERPL-MCNC: 9.4 MG/DL (ref 8.5–10.1)
CHLORIDE SERPL-SCNC: 104 MMOL/L (ref 97–108)
CO2 SERPL-SCNC: 24 MMOL/L (ref 21–32)
CREAT SERPL-MCNC: 0.84 MG/DL (ref 0.55–1.02)
DIFFERENTIAL METHOD BLD: ABNORMAL
EOSINOPHIL # BLD: 0.21 K/UL (ref 0–0.4)
EOSINOPHIL NFR BLD: 1.9 % (ref 0–0.7)
ERYTHROCYTE [DISTWIDTH] IN BLOOD BY AUTOMATED COUNT: 13.5 % (ref 11.5–14.5)
ETHANOL SERPL-MCNC: 109 MG/DL (ref 0–0.08)
GLOBULIN SER CALC-MCNC: 3.3 G/DL (ref 2–4)
GLUCOSE SERPL-MCNC: 82 MG/DL (ref 65–100)
HCT VFR BLD AUTO: 43.7 % (ref 35–47)
HGB BLD-MCNC: 14.6 G/DL (ref 11.5–16)
IMM GRANULOCYTES # BLD AUTO: 0.03 K/UL (ref 0–0.04)
IMM GRANULOCYTES NFR BLD AUTO: 0.3 % (ref 0–0.5)
LYMPHOCYTES # BLD: 2.58 K/UL (ref 0.8–3.5)
LYMPHOCYTES NFR BLD: 23.2 % (ref 12–49)
MCH RBC QN AUTO: 28.9 PG (ref 26–34)
MCHC RBC AUTO-ENTMCNC: 33.4 G/DL (ref 30–36.5)
MCV RBC AUTO: 86.5 FL (ref 80–99)
MONOCYTES # BLD: 0.97 K/UL (ref 0–1)
MONOCYTES NFR BLD: 8.7 % (ref 5–13)
NEUTS SEG # BLD: 7.27 K/UL (ref 1.8–8)
NEUTS SEG NFR BLD: 65.4 % (ref 32–75)
NRBC # BLD: 0 K/UL (ref 0–0.01)
NRBC BLD-RTO: 0 PER 100 WBC
PLATELET # BLD AUTO: 318 K/UL (ref 150–400)
PMV BLD AUTO: 9.8 FL (ref 8.9–12.9)
POTASSIUM SERPL-SCNC: 3.3 MMOL/L (ref 3.5–5.1)
PROT SERPL-MCNC: 7.3 G/DL (ref 6.4–8.2)
RBC # BLD AUTO: 5.05 M/UL (ref 3.8–5.2)
SODIUM SERPL-SCNC: 143 MMOL/L (ref 136–145)
WBC # BLD AUTO: 11.1 K/UL (ref 3.6–11)

## 2025-09-01 PROCEDURE — 80053 COMPREHEN METABOLIC PANEL: CPT

## 2025-09-01 PROCEDURE — 36415 COLL VENOUS BLD VENIPUNCTURE: CPT

## 2025-09-01 PROCEDURE — 6370000000 HC RX 637 (ALT 250 FOR IP): Performed by: EMERGENCY MEDICINE

## 2025-09-01 PROCEDURE — 99284 EMERGENCY DEPT VISIT MOD MDM: CPT

## 2025-09-01 PROCEDURE — 96374 THER/PROPH/DIAG INJ IV PUSH: CPT

## 2025-09-01 PROCEDURE — 85025 COMPLETE CBC W/AUTO DIFF WBC: CPT

## 2025-09-01 PROCEDURE — 6360000002 HC RX W HCPCS: Performed by: EMERGENCY MEDICINE

## 2025-09-01 PROCEDURE — 82077 ASSAY SPEC XCP UR&BREATH IA: CPT

## 2025-09-01 RX ORDER — DIAZEPAM 5 MG/1
5 TABLET ORAL
Status: DISCONTINUED | OUTPATIENT
Start: 2025-09-01 | End: 2025-09-01 | Stop reason: HOSPADM

## 2025-09-01 RX ORDER — DIAZEPAM 10 MG/2ML
5 INJECTION, SOLUTION INTRAMUSCULAR; INTRAVENOUS ONCE
Status: COMPLETED | OUTPATIENT
Start: 2025-09-01 | End: 2025-09-01

## 2025-09-01 RX ORDER — DIAZEPAM 5 MG/1
5 TABLET ORAL EVERY 6 HOURS PRN
Qty: 8 TABLET | Refills: 0 | Status: SHIPPED | OUTPATIENT
Start: 2025-09-01 | End: 2025-09-11

## 2025-09-01 RX ADMIN — DIAZEPAM 5 MG: 5 TABLET ORAL at 18:57

## 2025-09-01 RX ADMIN — DIAZEPAM 5 MG: 5 INJECTION, SOLUTION INTRAMUSCULAR; INTRAVENOUS at 18:26

## 2025-09-01 ASSESSMENT — LIFESTYLE VARIABLES
HOW MANY STANDARD DRINKS CONTAINING ALCOHOL DO YOU HAVE ON A TYPICAL DAY: PATIENT DOES NOT DRINK
HOW OFTEN DO YOU HAVE A DRINK CONTAINING ALCOHOL: NEVER
HOW OFTEN DO YOU HAVE A DRINK CONTAINING ALCOHOL: NEVER
HOW MANY STANDARD DRINKS CONTAINING ALCOHOL DO YOU HAVE ON A TYPICAL DAY: PATIENT DOES NOT DRINK

## 2025-09-01 ASSESSMENT — PAIN - FUNCTIONAL ASSESSMENT: PAIN_FUNCTIONAL_ASSESSMENT: 0-10

## 2025-09-01 ASSESSMENT — PAIN SCALES - GENERAL
PAINLEVEL_OUTOF10: 0

## 2025-09-03 ENCOUNTER — TELEPHONE (OUTPATIENT)
Age: 70
End: 2025-09-03